# Patient Record
Sex: FEMALE | Race: OTHER | Employment: FULL TIME | URBAN - METROPOLITAN AREA
[De-identification: names, ages, dates, MRNs, and addresses within clinical notes are randomized per-mention and may not be internally consistent; named-entity substitution may affect disease eponyms.]

---

## 2017-10-04 ENCOUNTER — HOSPITAL ENCOUNTER (OUTPATIENT)
Dept: GENERAL RADIOLOGY | Age: 57
Discharge: HOME | End: 2017-10-04
Payer: COMMERCIAL

## 2017-10-04 ENCOUNTER — HOSPITAL ENCOUNTER (OUTPATIENT)
Dept: PREADMISSION TESTING | Age: 57
Discharge: HOME | End: 2017-10-04
Payer: COMMERCIAL

## 2017-10-04 VITALS
DIASTOLIC BLOOD PRESSURE: 69 MMHG | HEART RATE: 63 BPM | BODY MASS INDEX: 22.74 KG/M2 | SYSTOLIC BLOOD PRESSURE: 124 MMHG | WEIGHT: 123.56 LBS | RESPIRATION RATE: 16 BRPM | OXYGEN SATURATION: 100 % | HEIGHT: 62 IN

## 2017-10-04 LAB
-: ABNORMAL
ABSOLUTE EOS #: 0.1 K/UL (ref 0–0.4)
ABSOLUTE LYMPH #: 2.1 K/UL (ref 1–4.8)
ABSOLUTE MONO #: 0.4 K/UL (ref 0.2–0.8)
AMORPHOUS: ABNORMAL
ANION GAP SERPL CALCULATED.3IONS-SCNC: 17 MMOL/L (ref 9–17)
BACTERIA: ABNORMAL
BASOPHILS # BLD: 0 %
BASOPHILS ABSOLUTE: 0 K/UL (ref 0–0.2)
BILIRUBIN URINE: NEGATIVE
CASTS UA: ABNORMAL /LPF
CHLORIDE BLD-SCNC: 96 MMOL/L (ref 98–107)
CO2: 28 MMOL/L (ref 20–31)
COLOR: YELLOW
COMMENT UA: ABNORMAL
CRYSTALS, UA: ABNORMAL /HPF
DIFFERENTIAL TYPE: NORMAL
EOSINOPHILS RELATIVE PERCENT: 2 %
EPITHELIAL CELLS UA: ABNORMAL /HPF
GLUCOSE URINE: NEGATIVE
HCT VFR BLD CALC: 41.9 % (ref 36–46)
HEMOGLOBIN: 14.1 G/DL (ref 12–16)
INR BLD: 1
KETONES, URINE: NEGATIVE
LEUKOCYTE ESTERASE, URINE: NEGATIVE
LYMPHOCYTES # BLD: 36 %
MCH RBC QN AUTO: 30.3 PG (ref 26–34)
MCHC RBC AUTO-ENTMCNC: 33.7 G/DL (ref 31–37)
MCV RBC AUTO: 89.9 FL (ref 80–100)
MONOCYTES # BLD: 7 %
MUCUS: ABNORMAL
NITRITE, URINE: NEGATIVE
OTHER OBSERVATIONS UA: ABNORMAL
PARTIAL THROMBOPLASTIN TIME: 26.9 SEC (ref 23–31)
PDW BLD-RTO: 12.9 % (ref 11.5–14.5)
PH UA: 7 (ref 5–8)
PLATELET # BLD: 328 K/UL (ref 130–400)
PLATELET ESTIMATE: NORMAL
PMV BLD AUTO: 7.6 FL (ref 6–12)
POTASSIUM SERPL-SCNC: 4.1 MMOL/L (ref 3.7–5.3)
PROTEIN UA: NEGATIVE
PROTHROMBIN TIME: 10.5 SEC (ref 9.7–11.6)
RBC # BLD: 4.66 M/UL (ref 4–5.2)
RBC # BLD: NORMAL 10*6/UL
RBC UA: ABNORMAL /HPF (ref 0–2)
RENAL EPITHELIAL, UA: ABNORMAL /HPF
SEG NEUTROPHILS: 55 %
SEGMENTED NEUTROPHILS ABSOLUTE COUNT: 3.3 K/UL (ref 1.8–7.7)
SODIUM BLD-SCNC: 141 MMOL/L (ref 135–144)
SPECIFIC GRAVITY UA: 1.01 (ref 1–1.03)
TRICHOMONAS: ABNORMAL
TURBIDITY: CLEAR
URINE HGB: ABNORMAL
UROBILINOGEN, URINE: NORMAL
WBC # BLD: 5.9 K/UL (ref 3.5–11)
WBC # BLD: NORMAL 10*3/UL
WBC UA: ABNORMAL /HPF (ref 0–5)
YEAST: ABNORMAL

## 2017-10-04 PROCEDURE — 85025 COMPLETE CBC W/AUTO DIFF WBC: CPT

## 2017-10-04 PROCEDURE — 87086 URINE CULTURE/COLONY COUNT: CPT

## 2017-10-04 PROCEDURE — 87641 MR-STAPH DNA AMP PROBE: CPT

## 2017-10-04 PROCEDURE — 80051 ELECTROLYTE PANEL: CPT

## 2017-10-04 PROCEDURE — 86900 BLOOD TYPING SEROLOGIC ABO: CPT

## 2017-10-04 PROCEDURE — 36415 COLL VENOUS BLD VENIPUNCTURE: CPT

## 2017-10-04 PROCEDURE — 86901 BLOOD TYPING SEROLOGIC RH(D): CPT

## 2017-10-04 PROCEDURE — 87088 URINE BACTERIA CULTURE: CPT

## 2017-10-04 PROCEDURE — 85730 THROMBOPLASTIN TIME PARTIAL: CPT

## 2017-10-04 PROCEDURE — 85610 PROTHROMBIN TIME: CPT

## 2017-10-04 PROCEDURE — 87186 SC STD MICRODIL/AGAR DIL: CPT

## 2017-10-04 PROCEDURE — 86920 COMPATIBILITY TEST SPIN: CPT

## 2017-10-04 PROCEDURE — 86850 RBC ANTIBODY SCREEN: CPT

## 2017-10-04 PROCEDURE — 81001 URINALYSIS AUTO W/SCOPE: CPT

## 2017-10-04 PROCEDURE — 71020 XR CHEST STANDARD TWO VW: CPT

## 2017-10-04 RX ORDER — IBUPROFEN 200 MG
200 TABLET ORAL EVERY 6 HOURS PRN
Status: ON HOLD | COMMUNITY
End: 2017-10-17 | Stop reason: HOSPADM

## 2017-10-04 RX ORDER — ACETAMINOPHEN, ASPIRIN AND CAFFEINE 250; 250; 65 MG/1; MG/1; MG/1
1 TABLET, FILM COATED ORAL EVERY 6 HOURS PRN
Status: ON HOLD | COMMUNITY
End: 2017-10-17 | Stop reason: HOSPADM

## 2017-10-04 RX ORDER — THIAMINE HCL 100 MG
500 TABLET ORAL DAILY
COMMUNITY

## 2017-10-04 ASSESSMENT — PAIN DESCRIPTION - ORIENTATION: ORIENTATION: LOWER

## 2017-10-04 ASSESSMENT — PAIN DESCRIPTION - LOCATION: LOCATION: BACK

## 2017-10-04 ASSESSMENT — PAIN DESCRIPTION - FREQUENCY: FREQUENCY: CONTINUOUS

## 2017-10-04 ASSESSMENT — PAIN DESCRIPTION - DIRECTION: RADIATING_TOWARDS: DOWN LEFT LEG TO FOOT

## 2017-10-04 ASSESSMENT — PAIN DESCRIPTION - DESCRIPTORS: DESCRIPTORS: SHARP;TINGLING

## 2017-10-04 ASSESSMENT — PAIN SCALES - GENERAL: PAINLEVEL_OUTOF10: 5

## 2017-10-04 ASSESSMENT — PAIN DESCRIPTION - PAIN TYPE: TYPE: ACUTE PAIN

## 2017-10-04 NOTE — PRE-PROCEDURE INSTRUCTIONS
[facility] ON Monday, October 16th at 08:00 AM    Once you enter the hospital lobby, take the elevators to the second floor. Check-In is at the surgery registration desk. If you have been given a blood band, you must bring it with you the day of surgery. Continue to take your home medications as you normally do up to and including the night before surgery with the exception of any blood thinning medications. Please stop any blood thinning medications as directed by your surgeon or prescribing physician. Failure to stop certain medications may interfere with your scheduled surgery. These may include:  Aspirin, Warfarin (Coumadin), Clopidogrel (Plavix), Ibuprofen (Motrin, Advil), Naproxen (Aleve), Meloxicam (Mobic), Celecoxib (Celebrex), Eliquis, Pradaxa, Xarelto, Effient, Fish Oil, Herbal supplements. Stop motrin and multivitamin one week prior to surgery. Tylenol is okay to take    If you are diabetic, do not take any of your diabetic medications by mouth the morning of surgery. If you are taking insulin contact the doctor that manages your diabetes for instructions about any changes to your insulin dosages the day before surgery. Do not inject insulin or other injectable diabetic medications the morning of surgery unless otherwise instructed by the doctor who manages your diabetes. Please take the following medication(s) the day of surgery with a small sip of water:  omeprazole  Please use your inhalers at home the day of surgery. PREPARING FOR YOUR SURGERY:     Before surgery, you can play an important role in your own health. Because skin is not sterile, we need to be sure that your skin is as free of germs as possible before surgery by carefully washing before surgery. Preparing or prepping skin before surgery can reduce the risk of a surgical site infection.   Do not shave the area of your body where your surgery will be performed unless you received specific permission from your physician. You will need to shower at home the night before surgery and the morning of surgery with a special soap called chlorhexidine gluconate (CHG*). *Not to be used by people allergic to Chlorhexidine Gluconate (CHG). Following these instructions will help you be sure that your skin is clean before surgery. Instructions on cleaning your skin before surgery: The night before your surgery:      You will need to shower with warm water (not hot) and the CHG soap.  Use a clean wash cloth and a clean towel. Have clean clothes available to put on after the shower.    First wash your hair with regular shampoo. Rinse your hair and body thoroughly to remove the shampoo. [place] Wash your face with your regular soap or water only. Thoroughly rinse your body with warm water from the neck down.  Turn water off to prevent rinsing the soap off too soon.  With a clean wet washcloth and half of the CHG soap in the bottle, lather your entire body from the neck down. Do not use CHG soap near your eyes or ears to avoid injury to those areas.  Wash thoroughly, paying special attention to the area where your surgery will be performed.  Wash your body gently for five (5) minutes. Avoid scrubbing your skin too hard.  Turn the water back on and rinse your body thoroughly.  Pat yourself dry with a clean, soft towel. Do not apply lotion, cream or powder.  Dress with clean freshly washed clothes. The morning of surgery:     Repeat shower following steps above - using remaining half of CHG soap in bottle. Patient Instructions:    [place] If you are having any type of anesthesia you are to have nothing to eat or drink after midnight the night before your surgery. This includes gum, mints, water or smoking or chewing tobacco.  The only exception to this is a small sip of water to take with any morning dose of heart, blood pressure, or seizure medications.   No alcoholic beverages for 24 hours prior to surgery.  Bring your eyeglasses and case with you. No contacts are to be worn the day of surgery. You also may bring your hearing aids.  If you are on C-PAP or Bi-PAP at home and plan on staying in the hospital overnight for your surgery please bring the machine with you. · Do not wear any jewelry or body piercings day of surgery. Also, NO lotion, perfume or deodorant to be used the day of surgery. No nail polish on the operative extremity (arm/leg surgeries)    · Do not bring any valuables such as jewelry, cash, or credit cards. If you are staying overnight with us, please bring a small bag of personal items.  Please wear loose, comfortable clothing. If you are potentially going to have a cast or brace bring clothing that will fit over them.  In case of illness - If you have cold or flu like symptoms (high fever, runny nose, sore throat, cough, etc.) rash, nausea, vomiting, loose stools, and/or recent contact with someone who has a contagious disease (chicken pox, measles, etc.) Please call your doctor before coming to the hospital.     If your child is having surgery please make arrangements for any other children to be cared for at home on the day of surgery. Other children are not permitted in recovery room and we want you to be able to spend time with the patient. If other arrangements are not available then we suggest that you have a second adult to stay in the waiting room. Day of Surgery/Procedure:    As a patient at [facility] you can expect quality medical and nursing care that is centered on your individual needs. Our goal is to make your surgical experience as comfortable as possible    . Transportation After Your Surgery/Procedure:     You will need a friend or family member to drive you home after your procedure. Your  must be 25years of age or older and able to sign off on your discharge instructions. A taxi cab or any other form of public transportation is not acceptable. Your friend or family member must stay at the hospital throughout your procedure. Someone must remain with you for the first 24 hours after your surgery if you receive anesthesia or medication. If you do not have someone to stay with you, your procedure may be cancelled.       If you have any other questions regarding your procedure or the day of surgery, please call [phone]      _________________________  ____________________________  Signature (Patient)              Signature (Provider) & date

## 2017-10-04 NOTE — H&P
clots  GASTROINTESTINAL:  negative for nausea, vomiting, diarrhea, constipation, abdominal pain, HAS GERD AS reflux, hematemesis and hemtochezia  GENITOURINARY:  negative for frequency, dysuria, nocturia, urinary incontinence and hematuria  INTEGUMENT/BREAST:  negative for rash and skin lesion(s)  HEMATOLOGIC/LYMPHATIC:  negative for lymphadenopathy  ENDOCRINE:  negative for diabetes, thyroid irregularities  MUSCULOSKELETAL:  HAS BACK AND LEFT LEG   pain, joint swelling, stiff joints, decreased range of motion and muscle weakness  NEUROLOGICAL:  negative for headaches, dizziness, seizures, memory problems, gait problems, tremor, numbness, syncope, near syncope and history of head injury, stroke or TIA  BEHAVIOR/PSYCH:  negative for depressed mood, anxiety and substance abuse issues    EXAM:  /69  Pulse 63  Resp 16  Ht 5' 1.5\" (1.562 m)  Wt 123 lb 9 oz (56 kg)  SpO2 100%  BMI 22.97 kg/m2  /69  Pulse 63  Resp 16  Ht 5' 1.5\" (1.562 m)  Wt 123 lb 9 oz (56 kg)  SpO2 100%  BMI 22.97 kg/m2  General appearance: alert, appears stated age and cooperative  Head: Normocephalic, without obvious abnormality, atraumatic  Eyes: conjunctivae/corneas clear. PERRL, EOM's intact. Fundi benign. Nose: Nares normal. Septum midline. Mucosa normal. No drainage or sinus tenderness. Throat: lips, mucosa, and tongue normal; teeth and gums normal  Neck: no adenopathy, no carotid bruit, no JVD, supple, symmetrical, trachea midline and thyroid not enlarged, symmetric, no tenderness/mass/nodules  Back: symmetric, no curvature. ROM normal. No CVA tenderness.   Lungs: clear to auscultation bilaterally  Heart: regular rate and rhythm, S1, S2 normal, no murmur, click, rub or gallop  Abdomen: soft, non-tender; bowel sounds normal; no masses,  no organomegaly  Extremities: extremities normal, atraumatic, no cyanosis or edema  Pulses: 2+ and symmetric  Skin: Skin color, texture, turgor normal. No rashes or lesions  Neurologic:

## 2017-10-05 LAB
MRSA, DNA, NASAL: NORMAL
SPECIMEN DESCRIPTION: NORMAL

## 2017-10-08 LAB
CULTURE: ABNORMAL
CULTURE: ABNORMAL
Lab: ABNORMAL
ORGANISM: ABNORMAL
SPECIMEN DESCRIPTION: ABNORMAL
SPECIMEN DESCRIPTION: ABNORMAL
STATUS: ABNORMAL

## 2017-10-16 ENCOUNTER — HOSPITAL ENCOUNTER (INPATIENT)
Age: 57
LOS: 1 days | Discharge: HOME | DRG: 460 | End: 2017-10-17
Attending: ORTHOPAEDIC SURGERY | Admitting: ORTHOPAEDIC SURGERY
Payer: COMMERCIAL

## 2017-10-16 ENCOUNTER — ANESTHESIA EVENT (OUTPATIENT)
Dept: OPERATING ROOM | Age: 57
DRG: 460 | End: 2017-10-16
Payer: COMMERCIAL

## 2017-10-16 ENCOUNTER — ANESTHESIA (OUTPATIENT)
Dept: OPERATING ROOM | Age: 57
DRG: 460 | End: 2017-10-16
Payer: COMMERCIAL

## 2017-10-16 ENCOUNTER — APPOINTMENT (OUTPATIENT)
Dept: GENERAL RADIOLOGY | Age: 57
DRG: 460 | End: 2017-10-16
Attending: ORTHOPAEDIC SURGERY
Payer: COMMERCIAL

## 2017-10-16 VITALS — DIASTOLIC BLOOD PRESSURE: 50 MMHG | TEMPERATURE: 96.3 F | OXYGEN SATURATION: 100 % | SYSTOLIC BLOOD PRESSURE: 81 MMHG

## 2017-10-16 PROBLEM — M48.061 SPINAL STENOSIS, LUMBAR: Status: ACTIVE | Noted: 2017-10-16

## 2017-10-16 PROBLEM — M43.17 ACQUIRED SPONDYLOLISTHESIS OF LUMBOSACRAL REGION: Status: ACTIVE | Noted: 2017-10-16

## 2017-10-16 PROCEDURE — 2580000003 HC RX 258: Performed by: ORTHOPAEDIC SURGERY

## 2017-10-16 PROCEDURE — 6370000000 HC RX 637 (ALT 250 FOR IP): Performed by: ORTHOPAEDIC SURGERY

## 2017-10-16 PROCEDURE — 6360000002 HC RX W HCPCS: Performed by: NURSE ANESTHETIST, CERTIFIED REGISTERED

## 2017-10-16 PROCEDURE — A6219 GAUZE <= 16 SQ IN W/BORDER: HCPCS | Performed by: ORTHOPAEDIC SURGERY

## 2017-10-16 PROCEDURE — 72100 X-RAY EXAM L-S SPINE 2/3 VWS: CPT

## 2017-10-16 PROCEDURE — 3700000001 HC ADD 15 MINUTES (ANESTHESIA): Performed by: ORTHOPAEDIC SURGERY

## 2017-10-16 PROCEDURE — 3600000004 HC SURGERY LEVEL 4 BASE: Performed by: ORTHOPAEDIC SURGERY

## 2017-10-16 PROCEDURE — 2580000003 HC RX 258: Performed by: ANESTHESIOLOGY

## 2017-10-16 PROCEDURE — 0SG30AJ FUSION OF LUMBOSACRAL JOINT WITH INTERBODY FUSION DEVICE, POSTERIOR APPROACH, ANTERIOR COLUMN, OPEN APPROACH: ICD-10-PCS | Performed by: ORTHOPAEDIC SURGERY

## 2017-10-16 PROCEDURE — 7100000000 HC PACU RECOVERY - FIRST 15 MIN: Performed by: ORTHOPAEDIC SURGERY

## 2017-10-16 PROCEDURE — A4364 ADHESIVE, LIQUID OR EQUAL: HCPCS | Performed by: ORTHOPAEDIC SURGERY

## 2017-10-16 PROCEDURE — 7100000001 HC PACU RECOVERY - ADDTL 15 MIN: Performed by: ORTHOPAEDIC SURGERY

## 2017-10-16 PROCEDURE — C9359 IMPLNT,BON VOID FILLER-PUTTY: HCPCS | Performed by: ORTHOPAEDIC SURGERY

## 2017-10-16 PROCEDURE — 6370000000 HC RX 637 (ALT 250 FOR IP): Performed by: PHYSICIAN ASSISTANT

## 2017-10-16 PROCEDURE — 6360000002 HC RX W HCPCS: Performed by: ORTHOPAEDIC SURGERY

## 2017-10-16 PROCEDURE — 3600000014 HC SURGERY LEVEL 4 ADDTL 15MIN: Performed by: ORTHOPAEDIC SURGERY

## 2017-10-16 PROCEDURE — 6360000002 HC RX W HCPCS: Performed by: ANESTHESIOLOGY

## 2017-10-16 PROCEDURE — 2500000003 HC RX 250 WO HCPCS: Performed by: NURSE ANESTHETIST, CERTIFIED REGISTERED

## 2017-10-16 PROCEDURE — C1713 ANCHOR/SCREW BN/BN,TIS/BN: HCPCS | Performed by: ORTHOPAEDIC SURGERY

## 2017-10-16 PROCEDURE — 2720000010 HC SURG SUPPLY STERILE: Performed by: ORTHOPAEDIC SURGERY

## 2017-10-16 PROCEDURE — 3700000000 HC ANESTHESIA ATTENDED CARE: Performed by: ORTHOPAEDIC SURGERY

## 2017-10-16 PROCEDURE — 6360000002 HC RX W HCPCS: Performed by: PHYSICIAN ASSISTANT

## 2017-10-16 PROCEDURE — 2580000003 HC RX 258: Performed by: PHYSICIAN ASSISTANT

## 2017-10-16 PROCEDURE — 3209999900 FLUORO FOR SURGICAL PROCEDURES

## 2017-10-16 PROCEDURE — 2780000010 HC IMPLANT OTHER: Performed by: ORTHOPAEDIC SURGERY

## 2017-10-16 PROCEDURE — L8699 PROSTHETIC IMPLANT NOS: HCPCS | Performed by: ORTHOPAEDIC SURGERY

## 2017-10-16 PROCEDURE — 2580000003 HC RX 258: Performed by: NURSE ANESTHETIST, CERTIFIED REGISTERED

## 2017-10-16 PROCEDURE — 1200000000 HC SEMI PRIVATE

## 2017-10-16 PROCEDURE — 2500000003 HC RX 250 WO HCPCS: Performed by: ORTHOPAEDIC SURGERY

## 2017-10-16 DEVICE — KIT TLIF DISP TUF HOOP MAXCESS MAS: Type: IMPLANTABLE DEVICE | Site: BACK | Status: FUNCTIONAL

## 2017-10-16 DEVICE — SCREW SPNL L40MM DIA6.5MM POST THORACOLUMBOSACRAL MOD SHANK: Type: IMPLANTABLE DEVICE | Site: BACK | Status: FUNCTIONAL

## 2017-10-16 DEVICE — SCREW SPNL L40MM DIA6.5MM POLYAX 2C RELINE MAS: Type: IMPLANTABLE DEVICE | Site: BACK | Status: FUNCTIONAL

## 2017-10-16 DEVICE — ROD SPNL L40MM DIA5.5MM POST THORACOLUMBOSACRAL TI LORD: Type: IMPLANTABLE DEVICE | Site: BACK | Status: FUNCTIONAL

## 2017-10-16 DEVICE — SCREW SPNL L45MM DIA6.5MM POLYAX 2C RELINE MAS: Type: IMPLANTABLE DEVICE | Site: BACK | Status: FUNCTIONAL

## 2017-10-16 DEVICE — SCREW SPNL DIA5.5MM OPN TULIP LOK RELINE: Type: IMPLANTABLE DEVICE | Site: BACK | Status: FUNCTIONAL

## 2017-10-16 DEVICE — SCREW SPNL L45MM DIA6.5MM POST THORACOLUMBOSACRAL MOD SHANK: Type: IMPLANTABLE DEVICE | Site: BACK | Status: FUNCTIONAL

## 2017-10-16 DEVICE — SCREW SPNL MOD TULIP RELINE: Type: IMPLANTABLE DEVICE | Site: BACK | Status: FUNCTIONAL

## 2017-10-16 DEVICE — GRAFT BNE PTTY 10 CC DBM DBMP10] UNIVERSAL MEDICAL PRODUCTS]: Type: IMPLANTABLE DEVICE | Status: FUNCTIONAL

## 2017-10-16 DEVICE — TIM-TISSUE CANCELLOUS 30.0CC CRUSHED: Type: IMPLANTABLE DEVICE | Site: BACK | Status: FUNCTIONAL

## 2017-10-16 DEVICE — IMPLANTABLE DEVICE: Type: IMPLANTABLE DEVICE | Site: BACK | Status: FUNCTIONAL

## 2017-10-16 RX ORDER — HYDRALAZINE HYDROCHLORIDE 20 MG/ML
5 INJECTION INTRAMUSCULAR; INTRAVENOUS EVERY 10 MIN PRN
Status: DISCONTINUED | OUTPATIENT
Start: 2017-10-16 | End: 2017-10-16 | Stop reason: HOSPADM

## 2017-10-16 RX ORDER — DEXAMETHASONE SODIUM PHOSPHATE 10 MG/ML
6 INJECTION INTRAMUSCULAR; INTRAVENOUS EVERY 8 HOURS
Status: COMPLETED | OUTPATIENT
Start: 2017-10-16 | End: 2017-10-17

## 2017-10-16 RX ORDER — MIDAZOLAM HYDROCHLORIDE 1 MG/ML
INJECTION INTRAMUSCULAR; INTRAVENOUS PRN
Status: DISCONTINUED | OUTPATIENT
Start: 2017-10-16 | End: 2017-10-16 | Stop reason: SDUPTHER

## 2017-10-16 RX ORDER — B-COMPLEX WITH VITAMIN C
1 TABLET ORAL DAILY
Status: DISCONTINUED | OUTPATIENT
Start: 2017-10-16 | End: 2017-10-16 | Stop reason: RX

## 2017-10-16 RX ORDER — OXYCODONE HYDROCHLORIDE AND ACETAMINOPHEN 5; 325 MG/1; MG/1
2 TABLET ORAL EVERY 4 HOURS PRN
Status: DISCONTINUED | OUTPATIENT
Start: 2017-10-16 | End: 2017-10-17 | Stop reason: HOSPADM

## 2017-10-16 RX ORDER — DIAZEPAM 5 MG/1
5 TABLET ORAL EVERY 6 HOURS PRN
Status: DISCONTINUED | OUTPATIENT
Start: 2017-10-16 | End: 2017-10-17 | Stop reason: HOSPADM

## 2017-10-16 RX ORDER — SODIUM CHLORIDE 0.9 % (FLUSH) 0.9 %
10 SYRINGE (ML) INJECTION PRN
Status: DISCONTINUED | OUTPATIENT
Start: 2017-10-16 | End: 2017-10-17 | Stop reason: HOSPADM

## 2017-10-16 RX ORDER — MORPHINE SULFATE 15 MG/1
15 TABLET, FILM COATED, EXTENDED RELEASE ORAL EVERY 12 HOURS SCHEDULED
Status: DISCONTINUED | OUTPATIENT
Start: 2017-10-16 | End: 2017-10-17 | Stop reason: HOSPADM

## 2017-10-16 RX ORDER — OXYCODONE HYDROCHLORIDE 5 MG/1
5 TABLET ORAL EVERY 4 HOURS PRN
Status: DISCONTINUED | OUTPATIENT
Start: 2017-10-16 | End: 2017-10-17 | Stop reason: HOSPADM

## 2017-10-16 RX ORDER — DOCUSATE SODIUM 100 MG/1
100 CAPSULE, LIQUID FILLED ORAL 2 TIMES DAILY
Status: DISCONTINUED | OUTPATIENT
Start: 2017-10-16 | End: 2017-10-17 | Stop reason: HOSPADM

## 2017-10-16 RX ORDER — SODIUM CHLORIDE, SODIUM LACTATE, POTASSIUM CHLORIDE, CALCIUM CHLORIDE 600; 310; 30; 20 MG/100ML; MG/100ML; MG/100ML; MG/100ML
INJECTION, SOLUTION INTRAVENOUS CONTINUOUS
Status: DISCONTINUED | OUTPATIENT
Start: 2017-10-16 | End: 2017-10-16

## 2017-10-16 RX ORDER — SODIUM CHLORIDE, SODIUM LACTATE, POTASSIUM CHLORIDE, CALCIUM CHLORIDE 600; 310; 30; 20 MG/100ML; MG/100ML; MG/100ML; MG/100ML
INJECTION, SOLUTION INTRAVENOUS CONTINUOUS PRN
Status: DISCONTINUED | OUTPATIENT
Start: 2017-10-16 | End: 2017-10-16 | Stop reason: SDUPTHER

## 2017-10-16 RX ORDER — ONDANSETRON 2 MG/ML
4 INJECTION INTRAMUSCULAR; INTRAVENOUS
Status: DISCONTINUED | OUTPATIENT
Start: 2017-10-16 | End: 2017-10-16 | Stop reason: HOSPADM

## 2017-10-16 RX ORDER — HYDROMORPHONE HCL 110MG/55ML
0.5 PATIENT CONTROLLED ANALGESIA SYRINGE INTRAVENOUS EVERY 5 MIN PRN
Status: DISCONTINUED | OUTPATIENT
Start: 2017-10-16 | End: 2017-10-16 | Stop reason: HOSPADM

## 2017-10-16 RX ORDER — MEPERIDINE HYDROCHLORIDE 25 MG/ML
12.5 INJECTION INTRAMUSCULAR; INTRAVENOUS; SUBCUTANEOUS EVERY 5 MIN PRN
Status: DISCONTINUED | OUTPATIENT
Start: 2017-10-16 | End: 2017-10-16 | Stop reason: HOSPADM

## 2017-10-16 RX ORDER — SODIUM CHLORIDE 0.9 % (FLUSH) 0.9 %
10 SYRINGE (ML) INJECTION EVERY 12 HOURS SCHEDULED
Status: DISCONTINUED | OUTPATIENT
Start: 2017-10-16 | End: 2017-10-17 | Stop reason: HOSPADM

## 2017-10-16 RX ORDER — SODIUM CHLORIDE 9 MG/ML
INJECTION, SOLUTION INTRAVENOUS CONTINUOUS
Status: DISCONTINUED | OUTPATIENT
Start: 2017-10-16 | End: 2017-10-17 | Stop reason: HOSPADM

## 2017-10-16 RX ORDER — DEXAMETHASONE SODIUM PHOSPHATE 10 MG/ML
4 INJECTION INTRAMUSCULAR; INTRAVENOUS
Status: DISCONTINUED | OUTPATIENT
Start: 2017-10-16 | End: 2017-10-16 | Stop reason: HOSPADM

## 2017-10-16 RX ORDER — OXYCODONE HYDROCHLORIDE AND ACETAMINOPHEN 5; 325 MG/1; MG/1
1 TABLET ORAL EVERY 4 HOURS PRN
Status: DISCONTINUED | OUTPATIENT
Start: 2017-10-16 | End: 2017-10-17 | Stop reason: HOSPADM

## 2017-10-16 RX ORDER — LABETALOL HYDROCHLORIDE 5 MG/ML
5 INJECTION, SOLUTION INTRAVENOUS EVERY 10 MIN PRN
Status: DISCONTINUED | OUTPATIENT
Start: 2017-10-16 | End: 2017-10-16 | Stop reason: HOSPADM

## 2017-10-16 RX ORDER — HYDROMORPHONE HCL 110MG/55ML
0.5 PATIENT CONTROLLED ANALGESIA SYRINGE INTRAVENOUS EVERY 5 MIN PRN
Status: COMPLETED | OUTPATIENT
Start: 2017-10-16 | End: 2017-10-16

## 2017-10-16 RX ORDER — MORPHINE SULFATE 2 MG/ML
2 INJECTION, SOLUTION INTRAMUSCULAR; INTRAVENOUS
Status: DISCONTINUED | OUTPATIENT
Start: 2017-10-16 | End: 2017-10-17 | Stop reason: HOSPADM

## 2017-10-16 RX ORDER — FENTANYL CITRATE 50 UG/ML
25 INJECTION, SOLUTION INTRAMUSCULAR; INTRAVENOUS EVERY 5 MIN PRN
Status: DISCONTINUED | OUTPATIENT
Start: 2017-10-16 | End: 2017-10-16 | Stop reason: HOSPADM

## 2017-10-16 RX ORDER — ACETAMINOPHEN 10 MG/ML
1000 INJECTION, SOLUTION INTRAVENOUS ONCE
Status: COMPLETED | OUTPATIENT
Start: 2017-10-16 | End: 2017-10-16

## 2017-10-16 RX ORDER — SUCCINYLCHOLINE CHLORIDE 20 MG/ML
INJECTION INTRAMUSCULAR; INTRAVENOUS PRN
Status: DISCONTINUED | OUTPATIENT
Start: 2017-10-16 | End: 2017-10-16 | Stop reason: SDUPTHER

## 2017-10-16 RX ORDER — DEXAMETHASONE SODIUM PHOSPHATE 10 MG/ML
INJECTION INTRAMUSCULAR; INTRAVENOUS PRN
Status: DISCONTINUED | OUTPATIENT
Start: 2017-10-16 | End: 2017-10-16 | Stop reason: SDUPTHER

## 2017-10-16 RX ORDER — ONDANSETRON 2 MG/ML
4 INJECTION INTRAMUSCULAR; INTRAVENOUS EVERY 6 HOURS PRN
Status: DISCONTINUED | OUTPATIENT
Start: 2017-10-16 | End: 2017-10-17 | Stop reason: HOSPADM

## 2017-10-16 RX ORDER — LIDOCAINE HYDROCHLORIDE 20 MG/ML
INJECTION, SOLUTION EPIDURAL; INFILTRATION; INTRACAUDAL; PERINEURAL PRN
Status: DISCONTINUED | OUTPATIENT
Start: 2017-10-16 | End: 2017-10-16 | Stop reason: SDUPTHER

## 2017-10-16 RX ORDER — PANTOPRAZOLE SODIUM 40 MG/1
40 TABLET, DELAYED RELEASE ORAL
Status: DISCONTINUED | OUTPATIENT
Start: 2017-10-16 | End: 2017-10-17 | Stop reason: HOSPADM

## 2017-10-16 RX ORDER — OMEPRAZOLE 20 MG/1
20 CAPSULE, DELAYED RELEASE ORAL DAILY
Status: DISCONTINUED | OUTPATIENT
Start: 2017-10-16 | End: 2017-10-16 | Stop reason: CLARIF

## 2017-10-16 RX ORDER — MORPHINE SULFATE 2 MG/ML
4 INJECTION, SOLUTION INTRAMUSCULAR; INTRAVENOUS
Status: DISCONTINUED | OUTPATIENT
Start: 2017-10-16 | End: 2017-10-17 | Stop reason: HOSPADM

## 2017-10-16 RX ORDER — PROPOFOL 10 MG/ML
INJECTION, EMULSION INTRAVENOUS PRN
Status: DISCONTINUED | OUTPATIENT
Start: 2017-10-16 | End: 2017-10-16 | Stop reason: SDUPTHER

## 2017-10-16 RX ORDER — BUPIVACAINE HYDROCHLORIDE AND EPINEPHRINE 5; 5 MG/ML; UG/ML
INJECTION, SOLUTION EPIDURAL; INTRACAUDAL; PERINEURAL PRN
Status: DISCONTINUED | OUTPATIENT
Start: 2017-10-16 | End: 2017-10-16 | Stop reason: HOSPADM

## 2017-10-16 RX ORDER — DIPHENHYDRAMINE HYDROCHLORIDE 50 MG/ML
12.5 INJECTION INTRAMUSCULAR; INTRAVENOUS
Status: DISCONTINUED | OUTPATIENT
Start: 2017-10-16 | End: 2017-10-16 | Stop reason: HOSPADM

## 2017-10-16 RX ORDER — FENTANYL CITRATE 50 UG/ML
INJECTION, SOLUTION INTRAMUSCULAR; INTRAVENOUS PRN
Status: DISCONTINUED | OUTPATIENT
Start: 2017-10-16 | End: 2017-10-16 | Stop reason: SDUPTHER

## 2017-10-16 RX ORDER — ONDANSETRON 2 MG/ML
INJECTION INTRAMUSCULAR; INTRAVENOUS PRN
Status: DISCONTINUED | OUTPATIENT
Start: 2017-10-16 | End: 2017-10-16 | Stop reason: SDUPTHER

## 2017-10-16 RX ADMIN — PHENYLEPHRINE HYDROCHLORIDE 100 MCG: 10 INJECTION INTRAVENOUS at 11:53

## 2017-10-16 RX ADMIN — FENTANYL CITRATE 50 MCG: 50 INJECTION, SOLUTION INTRAMUSCULAR; INTRAVENOUS at 13:27

## 2017-10-16 RX ADMIN — DIAZEPAM 5 MG: 5 TABLET ORAL at 16:14

## 2017-10-16 RX ADMIN — DOCUSATE SODIUM 100 MG: 100 CAPSULE, LIQUID FILLED ORAL at 22:05

## 2017-10-16 RX ADMIN — OXYCODONE HYDROCHLORIDE 5 MG: 5 TABLET ORAL at 20:22

## 2017-10-16 RX ADMIN — DEXAMETHASONE SODIUM PHOSPHATE 10 MG: 10 INJECTION INTRAMUSCULAR; INTRAVENOUS at 11:01

## 2017-10-16 RX ADMIN — ONDANSETRON 4 MG: 2 INJECTION, SOLUTION INTRAMUSCULAR; INTRAVENOUS at 13:00

## 2017-10-16 RX ADMIN — PHENYLEPHRINE HYDROCHLORIDE 100 MCG: 10 INJECTION INTRAVENOUS at 11:46

## 2017-10-16 RX ADMIN — PHENYLEPHRINE HYDROCHLORIDE 200 MCG: 10 INJECTION INTRAVENOUS at 12:02

## 2017-10-16 RX ADMIN — DIAZEPAM 5 MG: 5 TABLET ORAL at 23:19

## 2017-10-16 RX ADMIN — FENTANYL CITRATE 25 MCG: 50 INJECTION INTRAMUSCULAR; INTRAVENOUS at 13:45

## 2017-10-16 RX ADMIN — SUCCINYLCHOLINE CHLORIDE 80 MG: 20 INJECTION, SOLUTION INTRAMUSCULAR; INTRAVENOUS at 10:57

## 2017-10-16 RX ADMIN — HYDROMORPHONE HYDROCHLORIDE 0.5 MG: 2 INJECTION, SOLUTION INTRAMUSCULAR; INTRAVENOUS; SUBCUTANEOUS at 14:18

## 2017-10-16 RX ADMIN — LIDOCAINE HYDROCHLORIDE 75 MG: 20 INJECTION, SOLUTION EPIDURAL; INFILTRATION; INTRACAUDAL; PERINEURAL at 10:57

## 2017-10-16 RX ADMIN — SODIUM CHLORIDE, POTASSIUM CHLORIDE, SODIUM LACTATE AND CALCIUM CHLORIDE: 600; 310; 30; 20 INJECTION, SOLUTION INTRAVENOUS at 11:35

## 2017-10-16 RX ADMIN — FENTANYL CITRATE 50 MCG: 50 INJECTION, SOLUTION INTRAMUSCULAR; INTRAVENOUS at 13:24

## 2017-10-16 RX ADMIN — PHENYLEPHRINE HYDROCHLORIDE 100 MCG: 10 INJECTION INTRAVENOUS at 12:35

## 2017-10-16 RX ADMIN — ONDANSETRON 4 MG: 2 INJECTION INTRAMUSCULAR; INTRAVENOUS at 19:03

## 2017-10-16 RX ADMIN — SODIUM CHLORIDE, POTASSIUM CHLORIDE, SODIUM LACTATE AND CALCIUM CHLORIDE: 600; 310; 30; 20 INJECTION, SOLUTION INTRAVENOUS at 10:53

## 2017-10-16 RX ADMIN — MIDAZOLAM HYDROCHLORIDE 2 MG: 1 INJECTION, SOLUTION INTRAMUSCULAR; INTRAVENOUS at 10:53

## 2017-10-16 RX ADMIN — HYDROMORPHONE HYDROCHLORIDE 0.5 MG: 2 INJECTION, SOLUTION INTRAMUSCULAR; INTRAVENOUS; SUBCUTANEOUS at 14:29

## 2017-10-16 RX ADMIN — PROPOFOL 150 MG: 10 INJECTION, EMULSION INTRAVENOUS at 10:57

## 2017-10-16 RX ADMIN — FENTANYL CITRATE 100 MCG: 50 INJECTION, SOLUTION INTRAMUSCULAR; INTRAVENOUS at 10:57

## 2017-10-16 RX ADMIN — HYDROMORPHONE HYDROCHLORIDE 0.5 MG: 2 INJECTION, SOLUTION INTRAMUSCULAR; INTRAVENOUS; SUBCUTANEOUS at 15:26

## 2017-10-16 RX ADMIN — MORPHINE SULFATE 15 MG: 15 TABLET, FILM COATED, EXTENDED RELEASE ORAL at 22:06

## 2017-10-16 RX ADMIN — HYDROMORPHONE HYDROCHLORIDE 0.5 MG: 2 INJECTION, SOLUTION INTRAMUSCULAR; INTRAVENOUS; SUBCUTANEOUS at 15:03

## 2017-10-16 RX ADMIN — PHENYLEPHRINE HYDROCHLORIDE 200 MCG: 10 INJECTION INTRAVENOUS at 11:37

## 2017-10-16 RX ADMIN — PHENYLEPHRINE HYDROCHLORIDE 100 MCG: 10 INJECTION INTRAVENOUS at 11:20

## 2017-10-16 RX ADMIN — SODIUM CHLORIDE: 9 INJECTION, SOLUTION INTRAVENOUS at 18:01

## 2017-10-16 RX ADMIN — ACETAMINOPHEN 1000 MG: 10 INJECTION, SOLUTION INTRAVENOUS at 14:45

## 2017-10-16 RX ADMIN — SODIUM CHLORIDE, POTASSIUM CHLORIDE, SODIUM LACTATE AND CALCIUM CHLORIDE: 600; 310; 30; 20 INJECTION, SOLUTION INTRAVENOUS at 08:30

## 2017-10-16 RX ADMIN — CEFAZOLIN SODIUM 2 G: 2 SOLUTION INTRAVENOUS at 11:05

## 2017-10-16 RX ADMIN — PANTOPRAZOLE SODIUM 40 MG: 40 TABLET, DELAYED RELEASE ORAL at 18:01

## 2017-10-16 RX ADMIN — FENTANYL CITRATE 50 MCG: 50 INJECTION, SOLUTION INTRAMUSCULAR; INTRAVENOUS at 11:30

## 2017-10-16 RX ADMIN — HYDROMORPHONE HYDROCHLORIDE 0.5 MG: 2 INJECTION, SOLUTION INTRAMUSCULAR; INTRAVENOUS; SUBCUTANEOUS at 14:02

## 2017-10-16 RX ADMIN — HYDROMORPHONE HYDROCHLORIDE 0.5 MG: 2 INJECTION, SOLUTION INTRAMUSCULAR; INTRAVENOUS; SUBCUTANEOUS at 13:37

## 2017-10-16 RX ADMIN — DEXAMETHASONE SODIUM PHOSPHATE 6 MG: 10 INJECTION INTRAMUSCULAR; INTRAVENOUS at 18:01

## 2017-10-16 RX ADMIN — CEFAZOLIN SODIUM 2 G: 2 SOLUTION INTRAVENOUS at 18:01

## 2017-10-16 ASSESSMENT — PAIN DESCRIPTION - LOCATION
LOCATION: BACK

## 2017-10-16 ASSESSMENT — PAIN SCALES - GENERAL
PAINLEVEL_OUTOF10: 7
PAINLEVEL_OUTOF10: 8
PAINLEVEL_OUTOF10: 5
PAINLEVEL_OUTOF10: 5
PAINLEVEL_OUTOF10: 7
PAINLEVEL_OUTOF10: 8
PAINLEVEL_OUTOF10: 7
PAINLEVEL_OUTOF10: 5
PAINLEVEL_OUTOF10: 0

## 2017-10-16 ASSESSMENT — PAIN - FUNCTIONAL ASSESSMENT: PAIN_FUNCTIONAL_ASSESSMENT: 0-10

## 2017-10-16 ASSESSMENT — PAIN DESCRIPTION - PAIN TYPE
TYPE: SURGICAL PAIN

## 2017-10-16 ASSESSMENT — PAIN DESCRIPTION - FREQUENCY
FREQUENCY: CONTINUOUS
FREQUENCY: CONTINUOUS

## 2017-10-16 ASSESSMENT — PAIN DESCRIPTION - ORIENTATION
ORIENTATION: LOWER
ORIENTATION: LOWER

## 2017-10-16 ASSESSMENT — PAIN DESCRIPTION - ONSET
ONSET: ON-GOING
ONSET: ON-GOING

## 2017-10-16 ASSESSMENT — PAIN DESCRIPTION - DESCRIPTORS
DESCRIPTORS: TIGHTNESS
DESCRIPTORS: ACHING;SHARP;TIGHTNESS
DESCRIPTORS: ACHING;SHARP
DESCRIPTORS: ACHING;SHARP;TIGHTNESS

## 2017-10-16 NOTE — FLOWSHEET NOTE
Patient was very restless states RN's. Patient is now calm and sleeping. RN's asked if  would not enter the room.  left prayer card for RN to give to patient. Silent prayer was said. Follow up if needed.       10/16/17 1933   Encounter Summary   Services provided to: Patient   Referral/Consult From: [facility] Family members   Continue Visiting ([phone] PT: sleeping)   Complexity of Encounter Low   Length of Encounter 15 minutes   Spiritual Assessment Completed Yes   Routine   Type Initial   Assessment Sleeping   Intervention Prayer   Outcome Did not respond

## 2017-10-16 NOTE — PROGRESS NOTES
Pt admitted to room 2108. Oriented to room, call light and bed mechanics. Side rails up x2. Call light within reach. Orders reviewed. Her family is at her bedside.

## 2017-10-16 NOTE — H&P (VIEW-ONLY)
History and Physical  [facility]      Date: 10/4/2017  Name: North Booker  MRN: [id number]  [facility] Date: 10/4/2017  Primary Care: Erlinda Cook MD    History of Present Illness: North Booker is a 62 y.o., female who presents with SEVERE BACK PAIN FOR THE LAST EIGHT MONTHS PAIN  LEVEL RANGES FROM 5/10 TO 8/10 DEPENDING ON ACTIVITY. HER BACK HURTS AT SOME LEVEL CONTINUALLY THE PAIN RADIATES DOWN THE LEFT LEG  TO HER TOES . SHE HAS PREVIOUSLY BEEN TREATED WITH PHYSICAL THERAPY  WITHOUT MUCH HELP. SHE ALSO HAD MANIPULATION PREVIOUSLY. SHE NOW PRESENTS FOR A PRE-TESTING APPOINTMENT PRIOR TO  A MINIMALLY INVASIVE TRANS-FORAMINAL LUMBAR FUSION AT LEFT L 5-S 1 BY  ON 10/16/17. Past Medical History  HAS GERD     Past Surgical History  THREE  SECTIONS,COLONOSCOPY AND EGD     Social History:    TOBACCO:   has no tobacco history on file. Drug use:  has no drug history on file. ETOH:   has no alcohol history on file. HAS TWO TO  THREE DRINKS ON THE WEEKENDS  OCCUPATION:  [employer]ING [employer]   Lives with: NELLIE  Who do you see on a regular basis that helps you:?   AND FAMILY    Family History:   NO HEART DISEASE, NO DIABETES,BROTHER ,MOTHER HAVE HYPERTENSION, NO STROKES,FATHER HAD PROSTATE CANCER , OF METASTATIC DISEASE    Allergies:  NKDA    Medications:  Prior to Admission medications    Not on File       ROS:  chart review and the patient  CONSTITUTIONAL:  negative for  fevers, chills, sweats, fatigue, malaise, anorexia and weight loss  EYES:  WEARS   Glasses,NO  visual disturbance, irritation and icterus  HEENT:  negative for  hearing loss, tinnitus, ear drainage, earaches,HAS SINUS HEADACHES AS  nasal congestion, epistaxis, snoring, sore mouth, sore throat, hoarseness and voice change  RESPIRATORY:  negative for  dyspnea, wheezing, hemoptysis and cough  CARDIOVASCULAR:  Negative for chest pain, MVP, heart murmur, H/O Rheumatic fever, irregular heart, pacemaker/AICD, blood clots  GASTROINTESTINAL:  negative for nausea, vomiting, diarrhea, constipation, abdominal pain, HAS GERD AS reflux, hematemesis and hemtochezia  GENITOURINARY:  negative for frequency, dysuria, nocturia, urinary incontinence and hematuria  INTEGUMENT/BREAST:  negative for rash and skin lesion(s)  HEMATOLOGIC/LYMPHATIC:  negative for lymphadenopathy  ENDOCRINE:  negative for diabetes, thyroid irregularities  MUSCULOSKELETAL:  HAS BACK AND LEFT LEG   pain, joint swelling, stiff joints, decreased range of motion and muscle weakness  NEUROLOGICAL:  negative for headaches, dizziness, seizures, memory problems, gait problems, tremor, numbness, syncope, near syncope and history of head injury, stroke or TIA  BEHAVIOR/PSYCH:  negative for depressed mood, anxiety and substance abuse issues    EXAM:  /69  Pulse 63  Resp 16  Ht 5' 1.5\" (1.562 m)  Wt 123 lb 9 oz (56 kg)  SpO2 100%  BMI 22.97 kg/m2  /69  Pulse 63  Resp 16  Ht 5' 1.5\" (1.562 m)  Wt 123 lb 9 oz (56 kg)  SpO2 100%  BMI 22.97 kg/m2  General appearance: alert, appears stated age and cooperative  Head: Normocephalic, without obvious abnormality, atraumatic  Eyes: conjunctivae/corneas clear. PERRL, EOM's intact. Fundi benign. Nose: Nares normal. Septum midline. Mucosa normal. No drainage or sinus tenderness. Throat: lips, mucosa, and tongue normal; teeth and gums normal  Neck: no adenopathy, no carotid bruit, no JVD, supple, symmetrical, trachea midline and thyroid not enlarged, symmetric, no tenderness/mass/nodules  Back: symmetric, no curvature. ROM normal. No CVA tenderness.   Lungs: clear to auscultation bilaterally  Heart: regular rate and rhythm, S1, S2 normal, no murmur, click, rub or gallop  Abdomen: soft, non-tender; bowel sounds normal; no masses,  no organomegaly  Extremities: extremities normal, atraumatic, no cyanosis or edema  Pulses: 2+ and symmetric  Skin: Skin color, texture, turgor normal. No rashes or lesions  Neurologic: Alert and oriented X 3, normal strength and tone. Normal symmetric reflexes. Normal coordination and gait     Labs:  No results found for: HGB, HCT, WBC, PLATELET, NA, K, CL, CO2, BUN, CREATININE, GLUCOSE, INR    Lab Review:  CBC: No results found for: WBC, RBC, HGB, HCT, MCV, MCH, MCHC, RDW, PLT  BMP:  No results found for: GLUCOSE, NA, K, CL, CO2, ANIONGAP, BUN, CREATININE, BUNCRER, CALCIUM, LABGLOM, GFRAA, GFR  U/A:  No results found for: Janeece Charlene, SPECGRAV, HGBUR, PHUR, PROTEINU, GLUCOSEU, KETUA, BILIRUBINUR, UROBILINOGEN, NITRU, LEUKOCYTESUR    Provisional diagnosis:  Lumbar stenosis with degenerative disc disease and left radiculopathy    Plan:  1.  MINIMALLY INVASIVE TRANS-FORAMINAL LUMBAR INTERBODY FUSION LEFT L 5 S 1    See orders  Electronically signed by Nabila MILLER ACNP-BC on 10/4/2017 at 12:40 PM    Copy of H+P to Macey Nichols MD

## 2017-10-16 NOTE — ANESTHESIA POSTPROCEDURE EVALUATION
Department of Anesthesiology  Postprocedure Note    Patient: Pieter Roberts  MRN: [id number]  YOB: 1960  Date of evaluation: 10/16/2017  Time:  3:21 PM     Procedure Summary     Date:  10/16/17 Room / Location:  [facility] OR 05 / [facility]    Anesthesia Start:  1053 Anesthesia Stop:  1298    Procedure:  TRANSFORAMINAL LUMBAR FUSION MINIMALLY INVASIVE LEFT  L5S1  [place] (Left Back) Diagnosis:  (DX SPONDYLOLITHESIS & STENOSIS)    Surgeon:  Spero Bamberger, MD Responsible Provider:  Cheryl Finnegan MD    Anesthesia Type:  general ASA Status:  2          Anesthesia Type: general    Nawaf Phase I: Nawaf Score: 10    Nawaf Phase II:      Last vitals: Reviewed and per EMR flowsheets.        Anesthesia Post Evaluation    Patient location during evaluation: PACU  Level of consciousness: awake  Pain score: 2  Airway patency: patent  Nausea & Vomiting: no nausea and no vomiting  Complications: no  Cardiovascular status: blood pressure returned to baseline  Respiratory status: acceptable  Hydration status: euvolemic

## 2017-10-16 NOTE — ANESTHESIA PRE PROCEDURE
Department of Anesthesiology  Preprocedure Note       Name:  Rachel Lemos   Age:  62 y.o.  :  1960                                          MRN:  [id number]         Date:  10/16/2017      Surgeon: Casimiro Bowser):  Romeo Morse MD    Procedure: Procedure(s):  TRANSFORAMINAL LUMBAR FUSION MINIMALLY INVASIVE LEFT  L5S1  NUVASIVE    Medications prior to admission:   Prior to Admission medications    Medication Sig Start Date End Date Taking?  Authorizing Provider   aspirin-acetaminophen-caffeine (Everlene Lush) [phone] MG per tablet Take 1 tablet by mouth every 6 hours as needed for Headaches   Yes Historical Provider, MD   B Complex Vitamins (VITAMIN B COMPLEX PO) Take 1 tablet by mouth daily   Yes Historical Provider, MD   Magnesium 500 MG TABS Take 500 mg by mouth daily   Yes Historical Provider, MD   Multiple Vitamins-Minerals (MULTIVITAMIN & MINERAL PO) Take 1 tablet by mouth daily   Yes Historical Provider, MD   ibuprofen (ADVIL;MOTRIN) 200 MG tablet Take 200 mg by mouth every 6 hours as needed for Pain    Historical Provider, MD   OMEPRAZOLE PO Take 20 mg by mouth daily    Historical Provider, MD       Current medications:    Current Facility-Administered Medications   Medication Dose Route Frequency Provider Last Rate Last Dose    ceFAZolin (ANCEF) 2 g in dextrose 3 % 50 mL IVPB (duplex)  2 g Intravenous Once Romeo Morse MD        lactated ringers infusion   Intravenous Continuous Lucy Avitia  mL/hr at 10/16/17 0830      lidocaine 1% (buffered) injection 0.5 mL  0.5 mL Subdermal Once Lucy Avitia MD        fentaNYL (SUBLIMAZE) injection 25 mcg  25 mcg Intravenous Q5 Min PRN Johanny Valderrama MD        HYDROmorphone (DILAUDID) injection 0.5 mg  0.5 mg Intravenous Q5 Min PRN Johanny Valderrama MD        fentaNYL (SUBLIMAZE) injection 25 mcg  25 mcg Intravenous Q5 Min PRN Johanny Valderrama MD        HYDROmorphone (DILAUDID) injection 0.5 mg  0.5 mg Intravenous Q5 Min PRN Johanny Marcy Musa MD        diphenhydrAMINE (BENADRYL) injection 12.5 mg  12.5 mg Intravenous Once PRN Wili Timmons MD        ondansetron (ZOFRAN) injection 4 mg  4 mg Intravenous Once PRN Wili Timmons MD        dexamethasone (DECADRON) injection 4 mg  4 mg Intravenous Once PRN Wili Timmons MD        labetalol (NORMODYNE;TRANDATE) injection 5 mg  5 mg Intravenous Q10 Min PRN Johanny Musa MD        hydrALAZINE (APRESOLINE) injection 5 mg  5 mg Intravenous Q10 Min PRN Johanny Musa MD        meperidine (DEMEROL) injection 12.5 mg  12.5 mg Intravenous Q5 Min PRN Wili Timmons MD           Allergies:  No Known Allergies    Problem List:  There is no problem list on file for this patient. Past Medical History:  History reviewed. No pertinent past medical history. Past Surgical History:        Procedure Laterality Date     SECTION      x 3    COLONOSCOPY  2017    UPPER GASTROINTESTINAL ENDOSCOPY  2017       Social History:    Social History   Substance Use Topics    Smoking status: Never Smoker    Smokeless tobacco: Never Used    Alcohol use 1.8 oz/week     3 Shots of liquor per week                                Counseling given: Not Answered      Vital Signs (Current):   Vitals:    10/16/17 0759 10/16/17 0801   BP:  123/79   Pulse:  72   Resp:  14   Temp:  97.7 °F (36.5 °C)   TempSrc:  Oral   SpO2:  98%   Weight: 123 lb (55.8 kg)    Height: 5' 1\" (1.549 m)                                               BP Readings from Last 3 Encounters:   10/16/17 123/79   10/04/17 124/69       NPO Status: Time of last liquid consumption:                         Time of last solid consumption:                         Date of last liquid consumption: 10/15/17                        Date of last solid food consumption: 10/15/17    BMI:   Wt Readings from Last 3 Encounters:   10/16/17 123 lb (55.8 kg)   10/04/17 123 lb 9 oz (56 kg)     Body mass index is 23.24 kg/m².     CBC:

## 2017-10-16 NOTE — PROGRESS NOTES
Spoke with Dr. Pratt Flurrjolene in regards to patient's pain after receiving 2mg total of dilaudid and 25mcg of fentanyl. Pt stating her back is feeling really tight and her pain fluctuates from 5-9. Pt is ok to have 1g Ofirmev IV.

## 2017-10-17 VITALS
BODY MASS INDEX: 23.22 KG/M2 | RESPIRATION RATE: 16 BRPM | WEIGHT: 123 LBS | DIASTOLIC BLOOD PRESSURE: 64 MMHG | SYSTOLIC BLOOD PRESSURE: 109 MMHG | OXYGEN SATURATION: 100 % | HEIGHT: 61 IN | HEART RATE: 66 BPM | TEMPERATURE: 97.4 F

## 2017-10-17 LAB
ABSOLUTE EOS #: 0 K/UL (ref 0–0.4)
ABSOLUTE LYMPH #: 0.9 K/UL (ref 1–4.8)
ABSOLUTE MONO #: 0.5 K/UL (ref 0.2–0.8)
ANION GAP SERPL CALCULATED.3IONS-SCNC: 11 MMOL/L (ref 9–17)
BASOPHILS # BLD: 1 %
BASOPHILS ABSOLUTE: 0.1 K/UL (ref 0–0.2)
BUN BLDV-MCNC: 14 MG/DL (ref 6–20)
BUN/CREAT BLD: 19 (ref 9–20)
CALCIUM SERPL-MCNC: 9.3 MG/DL (ref 8.6–10.4)
CHLORIDE BLD-SCNC: 100 MMOL/L (ref 98–107)
CO2: 26 MMOL/L (ref 20–31)
CREAT SERPL-MCNC: 0.75 MG/DL (ref 0.5–0.9)
DIFFERENTIAL TYPE: ABNORMAL
EOSINOPHILS RELATIVE PERCENT: 0 %
GFR AFRICAN AMERICAN: >60 ML/MIN
GFR NON-AFRICAN AMERICAN: >60 ML/MIN
GFR SERPL CREATININE-BSD FRML MDRD: ABNORMAL ML/MIN/{1.73_M2}
GFR SERPL CREATININE-BSD FRML MDRD: ABNORMAL ML/MIN/{1.73_M2}
GLUCOSE BLD-MCNC: 135 MG/DL (ref 70–99)
HCT VFR BLD CALC: 36.1 % (ref 36–46)
HEMOGLOBIN: 12.2 G/DL (ref 12–16)
LYMPHOCYTES # BLD: 7 %
MAGNESIUM: 1.9 MG/DL (ref 1.6–2.6)
MCH RBC QN AUTO: 30.5 PG (ref 26–34)
MCHC RBC AUTO-ENTMCNC: 33.8 G/DL (ref 31–37)
MCV RBC AUTO: 90.2 FL (ref 80–100)
MONOCYTES # BLD: 4 %
PDW BLD-RTO: 13.1 % (ref 11.5–14.5)
PLATELET # BLD: 293 K/UL (ref 130–400)
PLATELET ESTIMATE: ABNORMAL
PMV BLD AUTO: 7.6 FL (ref 6–12)
POTASSIUM SERPL-SCNC: 4.9 MMOL/L (ref 3.7–5.3)
RBC # BLD: 4.01 M/UL (ref 4–5.2)
RBC # BLD: ABNORMAL 10*6/UL
SEG NEUTROPHILS: 88 %
SEGMENTED NEUTROPHILS ABSOLUTE COUNT: 10.9 K/UL (ref 1.8–7.7)
SODIUM BLD-SCNC: 137 MMOL/L (ref 135–144)
WBC # BLD: 12.4 K/UL (ref 3.5–11)
WBC # BLD: ABNORMAL 10*3/UL

## 2017-10-17 PROCEDURE — 97530 THERAPEUTIC ACTIVITIES: CPT

## 2017-10-17 PROCEDURE — G8978 MOBILITY CURRENT STATUS: HCPCS

## 2017-10-17 PROCEDURE — G8979 MOBILITY GOAL STATUS: HCPCS

## 2017-10-17 PROCEDURE — 97161 PT EVAL LOW COMPLEX 20 MIN: CPT

## 2017-10-17 PROCEDURE — 97116 GAIT TRAINING THERAPY: CPT

## 2017-10-17 PROCEDURE — 2580000003 HC RX 258: Performed by: PHYSICIAN ASSISTANT

## 2017-10-17 PROCEDURE — 80048 BASIC METABOLIC PNL TOTAL CA: CPT

## 2017-10-17 PROCEDURE — 6370000000 HC RX 637 (ALT 250 FOR IP): Performed by: PHYSICIAN ASSISTANT

## 2017-10-17 PROCEDURE — 83735 ASSAY OF MAGNESIUM: CPT

## 2017-10-17 PROCEDURE — 36415 COLL VENOUS BLD VENIPUNCTURE: CPT

## 2017-10-17 PROCEDURE — 6360000002 HC RX W HCPCS: Performed by: PHYSICIAN ASSISTANT

## 2017-10-17 PROCEDURE — 85025 COMPLETE CBC W/AUTO DIFF WBC: CPT

## 2017-10-17 RX ORDER — MORPHINE SULFATE 15 MG/1
15 TABLET, FILM COATED, EXTENDED RELEASE ORAL EVERY 12 HOURS SCHEDULED
Qty: 14 TABLET | Refills: 0 | Status: SHIPPED | OUTPATIENT
Start: 2017-10-17 | End: 2017-10-24

## 2017-10-17 RX ORDER — OXYCODONE HYDROCHLORIDE AND ACETAMINOPHEN 5; 325 MG/1; MG/1
TABLET ORAL
Qty: 80 TABLET | Refills: 0 | Status: SHIPPED | OUTPATIENT
Start: 2017-10-17 | End: 2022-05-17

## 2017-10-17 RX ORDER — DIAZEPAM 5 MG/1
5 TABLET ORAL EVERY 6 HOURS PRN
Qty: 60 TABLET | Refills: 0 | Status: SHIPPED | OUTPATIENT
Start: 2017-10-17 | End: 2017-10-27

## 2017-10-17 RX ORDER — PSEUDOEPHEDRINE HCL 30 MG
100 TABLET ORAL 2 TIMES DAILY
Qty: 60 CAPSULE | Refills: 0 | Status: SHIPPED | OUTPATIENT
Start: 2017-10-17

## 2017-10-17 RX ADMIN — MORPHINE SULFATE 15 MG: 15 TABLET, FILM COATED, EXTENDED RELEASE ORAL at 09:13

## 2017-10-17 RX ADMIN — Medication 10 ML: at 09:13

## 2017-10-17 RX ADMIN — DEXAMETHASONE SODIUM PHOSPHATE 6 MG: 10 INJECTION INTRAMUSCULAR; INTRAVENOUS at 02:51

## 2017-10-17 RX ADMIN — OXYCODONE HYDROCHLORIDE AND ACETAMINOPHEN 2 TABLET: 5; 325 TABLET ORAL at 01:57

## 2017-10-17 RX ADMIN — OXYCODONE HYDROCHLORIDE 5 MG: 5 TABLET ORAL at 12:59

## 2017-10-17 RX ADMIN — OXYCODONE HYDROCHLORIDE AND ACETAMINOPHEN 2 TABLET: 5; 325 TABLET ORAL at 06:38

## 2017-10-17 RX ADMIN — DOCUSATE SODIUM 100 MG: 100 CAPSULE, LIQUID FILLED ORAL at 09:13

## 2017-10-17 RX ADMIN — DEXAMETHASONE SODIUM PHOSPHATE 6 MG: 10 INJECTION INTRAMUSCULAR; INTRAVENOUS at 11:10

## 2017-10-17 RX ADMIN — CEFAZOLIN SODIUM 2 G: 2 SOLUTION INTRAVENOUS at 01:58

## 2017-10-17 RX ADMIN — PANTOPRAZOLE SODIUM 40 MG: 40 TABLET, DELAYED RELEASE ORAL at 05:14

## 2017-10-17 RX ADMIN — OXYCODONE HYDROCHLORIDE AND ACETAMINOPHEN 1 TABLET: 5; 325 TABLET ORAL at 11:01

## 2017-10-17 ASSESSMENT — PAIN DESCRIPTION - FREQUENCY
FREQUENCY: CONTINUOUS
FREQUENCY: CONTINUOUS

## 2017-10-17 ASSESSMENT — PAIN DESCRIPTION - ORIENTATION
ORIENTATION: LOWER
ORIENTATION: LOWER

## 2017-10-17 ASSESSMENT — PAIN DESCRIPTION - PAIN TYPE
TYPE: SURGICAL PAIN

## 2017-10-17 ASSESSMENT — PAIN SCALES - GENERAL
PAINLEVEL_OUTOF10: 5
PAINLEVEL_OUTOF10: 1
PAINLEVEL_OUTOF10: 3
PAINLEVEL_OUTOF10: 6
PAINLEVEL_OUTOF10: 2
PAINLEVEL_OUTOF10: 8
PAINLEVEL_OUTOF10: 5

## 2017-10-17 ASSESSMENT — PAIN DESCRIPTION - DESCRIPTORS
DESCRIPTORS: ACHING;SHARP
DESCRIPTORS: ACHING;SHARP;TIGHTNESS

## 2017-10-17 ASSESSMENT — PAIN DESCRIPTION - LOCATION
LOCATION: BACK

## 2017-10-17 ASSESSMENT — PAIN DESCRIPTION - ONSET
ONSET: ON-GOING
ONSET: ON-GOING

## 2017-10-17 NOTE — PLAN OF CARE
Problem: Pain:  Goal: Pain level will decrease  Pain level will decrease   Outcome: Ongoing    Goal: Control of acute pain  Control of acute pain   Outcome: Ongoing    Goal: Control of chronic pain  Control of chronic pain   Outcome: Ongoing      Problem: Falls - Risk of:  Goal: Will remain free from falls  Will remain free from falls   Outcome: Ongoing    Goal: Absence of physical injury  Absence of physical injury   Outcome: Ongoing      Problem: SAFETY  Goal: Free from accidental physical injury  Outcome: Ongoing    Goal: Free from intentional harm  Outcome: Completed Date Met: 10/17/17      Problem: DAILY CARE  Goal: Daily care needs are met  Outcome: Ongoing      Problem: PAIN  Goal: Patient's pain/discomfort is manageable  Outcome: Ongoing      Problem: SKIN INTEGRITY  Goal: Skin integrity is maintained or improved  Outcome: Ongoing      Problem: KNOWLEDGE DEFICIT  Goal: Patient/S.O. demonstrates understanding of disease process, treatment plan, medications, and discharge instructions.   Outcome: Ongoing      Problem: DISCHARGE BARRIERS  Goal: Patient's continuum of care needs are met  Outcome: Ongoing

## 2017-10-17 NOTE — PLAN OF CARE
Problem: Pain:  Goal: Pain level will decrease  Pain level will decrease   Outcome: Ongoing  Pt medicated with pain medication prn. Assessed all pain characteristics including level, type, location, frequency, and onset. Non-pharmacologic interventions offered to pt as well. Pt states pain is tolerable at this time. Will continue to monitor      Problem: Falls - Risk of  Goal: Absence of falls  Outcome: Ongoing  Patient is a fall risk during this admission. Fall risk assessment was performed. Patient is absent of falls. Bed is in the lowest position. Wheels on the bed are locked. Call light and bed side table are within reach. Clutter is removed. Patient was educated to call out when needing assistance or wanting to get out of bed. Patient offered toileting assistance during rounding. Hourly rounds have been performed.

## 2017-10-17 NOTE — PROGRESS NOTES
tablet 15 mg, 15 mg, Oral, 2 times per day  oxyCODONE (ROXICODONE) immediate release tablet 5 mg, 5 mg, Oral, Q4H PRN  sodium chloride flush 0.9 % injection 10 mL, 10 mL, Intravenous, 2 times per day  sodium chloride flush 0.9 % injection 10 mL, 10 mL, Intravenous, PRN  docusate sodium (COLACE) capsule 100 mg, 100 mg, Oral, BID  ondansetron (ZOFRAN) injection 4 mg, 4 mg, Intravenous, Q6H PRN  0.9 % sodium chloride infusion, , Intravenous, Continuous  oxyCODONE-acetaminophen (PERCOCET) 5-325 MG per tablet 1 tablet, 1 tablet, Oral, Q4H PRN **OR** oxyCODONE-acetaminophen (PERCOCET) 5-325 MG per tablet 2 tablet, 2 tablet, Oral, Q4H PRN  morphine (PF) injection 2 mg, 2 mg, Intravenous, Q2H PRN **OR** morphine (PF) injection 4 mg, 4 mg, Intravenous, Q2H PRN  diazepam (VALIUM) tablet 5 mg, 5 mg, Oral, Q6H PRN  pantoprazole (PROTONIX) tablet 40 mg, 40 mg, Oral, QAM AC    ASSESSMENT AND PLAN    62 y.o. female status post L5-S1 MIS TLIF/PSFI post op day # 1    1. PT- WBAT  2. Pain control  3. EPC  4.  D/C home later today after working with PT     Chepe Gee, SENAIT  [facility]  Physician Assistant to Dr. Spero Bamberger  [phone]

## 2017-10-17 NOTE — PROGRESS NOTES
Independent  Homemaking Assistance: Independent  Homemaking Responsibilities: Yes  Ambulation Assistance: Independent  Transfer Assistance: Independent  Active : Yes  Mode of Transportation: Car  Occupation: Full time employment  Type of occupation:   Objective          AROM RLE (degrees)  RLE AROM: WFL  AROM LLE (degrees)  LLE AROM : WFL  AROM RUE (degrees)  RUE AROM : WFL  AROM LUE (degrees)  LUE AROM : WFL  Strength RLE  Comment: 4/5 hip  Strength LLE  Comment: 4/5 hip   Strength RUE  Strength RUE: WFL  Strength LUE  Strength LUE: WFL  Coordination  Movements Are Fluid And Coordinated: Yes  Motor Control  Gross Motor?: WFL  Sensation  Overall Sensation Status: WFL  Bed mobility  Rolling to Left: Supervision  Rolling to Right: Supervision  Supine to Sit: Contact guard assistance  Sit to Supine: Contact guard assistance  Scooting: Contact guard assistance   Comment: Pt was Ed on back sparing technique for transfers in & OOB to maintain spinal precautions  Pt sat at EOB for 8 minutes to rest after bed mobility, Ed on donning of back brace,  initiated donning of back brace, & to prepare lines for standing. Transfers  Sit to Stand: Contact guard assistance,  Pt stood 3 minutes to stabilize feet underneath & to tighten & fit back brace   Stand to sit: Contact guard assistance  Bed to Chair: Contact guard assistance  Stand Pivot Transfers: Contact guard assistance  Comment: cues for safety  Ambulation  Ambulation?: Yes  Ambulation 1  Surface: level tile  Device: Rolling Walker  Assistance: Contact guard assistance  Quality of Gait: very slow & guarded , step to pattern  Distance: 75ft     Balance  Sitting - Static: Good  Sitting - Dynamic: Good  Standing - Static: Good;-  Standing - Dynamic: Fair;+    Pt sat to EOB after gait, rested 3 minutes then stood, ambulated 2 ft & assisted to chair. Cues to reach arms back to hold arms of chair & to sit safely.   Pt educated on home walking program. All lines intact, call light within reach, breakfast tray in front after meal arrival, and patient positioned comfortably at end of treatment. All patient needs addressed prior to ending therapy session. Assessment   Body structures, Functions, Activity limitations: Decreased functional mobility ; Decreased strength;Decreased endurance;Decreased balance  Pt requires continued PT at this time to improve strength, functional mobility independence, balance & activity tolerance to regain full independence. Prognosis: Excellent  Clinical Presentation: stable  Patient Education: PT POC, functional mobility, donning & doffing of back brace & spinal precautions  Barriers to Learning: none  REQUIRES PT FOLLOW UP: Yes  Activity Tolerance  Activity Tolerance: Patient limited by pain; Patient limited by endurance     Discharge Recommendations:  Home with assist PRN, Outpatient PT      Plan   Plan  Times per week: 1-2x/D, 5-6D/week  Current Treatment Recommendations: Strengthening, Functional Mobility Training, Endurance Training, Gait Training, Home Exercise Program, Safety Education & Training, Patient/Caregiver Education & Training  Safety Devices  Type of devices: Bed alarm in place, Call light within reach, Gait belt, Left in chair, Nurse notified    G-Code    Functional Assessment Tool Used: [facility] Physical Therapy Acute Care Functional Outcomes    Score = 19    Functional Limitation: Mobility & Walking Around    Mobility & Walking Around Current ZCNMJE():     CJ = At least 20% but less than 40% impaired, limited or restricted    Mobility & Moving Around Goal TADQSD():    CH:  0 % impaired, limited or restricted      OutComes Score                                           Goals  Short term goals  Time Frame for Short term goals: 8 visits  Short term goal 1: Inc bed-mobility & transfers to independent; Short term goal 2: Inc gait to amb 400ft or > independently;   Short term goal 3: Pt able to tolerate 30 min of activity to include 15-20 reps of ex & functional mobility to facilitate better activity tolerance; Short term goal 4: Ed on spinal precautions, bed mobility within spinal precautions, donning & doffing of lumbar corsett brace, posture, energy conservation & safety principles;   Patient Goals   Patient goals : return to being active, enjoy walking & my cardiovascular ex's       Therapy Time   Individual Concurrent Group Co-treatment   Time In  0730         Time Out  0809         Minutes  Sarwat 62, PT

## 2017-10-18 NOTE — DISCHARGE SUMMARY
Physician Discharge Summary     Patient ID:  Durga Chester  [id number]  87 y.o.  1960    Admit date: 10/16/2017    Discharge date and time: 10/17/2017  1:15 PM     Admitting Physician: Monica Lambert MD     Discharge Physician: Monica Lambert MD    Admission Diagnoses: Acquired spondylolisthesis [M43.10]    Discharge Diagnoses: Acquired spondylolisthesis [facility]    Hospital Course: Patient underwent L5-S1 MIS TLIF/PSFI and was admitted to hospital for post op pain managementy and physical therapy. She progressed well and was discharged to home in good condition on post op day #1.     Consults:  none    Treatments: antibiotics-ancef, steroids-decadron, analgesia-morphine, therapies- skilled nursing and PT    Disposition: home    Patient Instructions:   Meds: see list  Activity: WBAT  Diet: regular  Wound Care: dry dressing daily x 1wk    Follow-up 2 weeks  Signed:  [facility]  Physician Assistant to Dr. Monica Lambert  [phone]    10/18/2017  4:05 PM

## 2017-10-20 LAB
ABO/RH: NORMAL
ANTIBODY SCREEN: NEGATIVE
ARM BAND NUMBER: NORMAL
BLD PROD TYP BPU: NORMAL
BLD PROD TYP BPU: NORMAL
CROSSMATCH RESULT: NORMAL
CROSSMATCH RESULT: NORMAL
DISPENSE STATUS BLOOD BANK: NORMAL
DISPENSE STATUS BLOOD BANK: NORMAL
EXPIRATION DATE: NORMAL
TRANSFUSION STATUS: NORMAL
TRANSFUSION STATUS: NORMAL
UNIT DIVISION: 0
UNIT DIVISION: 0
UNIT NUMBER: NORMAL
UNIT NUMBER: NORMAL

## 2017-10-23 NOTE — OP NOTE
was used to complete the Stahl laminectomy as well  as Kerrison and curettes to expose the underlying dura. I removed the  ligamentum flavum and exposed the exiting L5 and traversing S1 nerve  roots in their entirety so that they were completely freed up. Once  the Stahl laminectomy was complete, nerve root retractor was placed  epidural hemostasis was achieved. Disc was then fully prepped with  removal of the L5-S1 disc as well as the cartilaginous end plates. Sizing was performed, which showed that an 8 x 10 x 25 mm, 4-degree  cage would be appropriate. This bone graft was then packed in to the  disc space as well as the cage and the cage was inserted under C-arm  guidance until it was fully seated at the L5-S1 level. At this point,  the hemostasis was once again achieved. Posterior lateral dissection  was then performed exposing the transverse process of L5 and the  sacral ala. These areas were then burred. The wound was then  irrigated with sterile normal saline with Bacitracin. The remainder  of the bone graft was then placed in the posterior lateral gutter from  L5-S1 for posterior lateral fusion. While once this was done, Gretchen Roach  was used to confirm that the decompression was complete, which it was. Shims were removed from the screw head. The screw heads with towers  were then placed and the screws were then fully seated. The retractor  was then removed. Rods were then measured and placed percutaneously  through the tower devices bilaterally and set screws were then placed  and subsequently finally tightened at the L5-S1 level. The C-arm was  brought in and confirmed the rods to be appropriate length. [place]  devices were removed as well as the reba inserted. The wounds were  then irrigated with sterile normal saline with bacitracin. Final  pictures were taken, which showed all instrumentation remain in  excellent position at L5-S1. Vancomycin powder was then placed within  the wounds. Closure was then performed with 0-Vicryl, 2-0 Vicryl, and  a running 4-0 Monocryl suture with Dermabond glue. Standard dressings  were then applied. She was then placed supine on her bed, extubated,  and taken to recovery room in stable condition.       Meghann Garcia      D: 10/22/2017 11:50:29       T: 10/22/2017 14:15:05     [id number]  Job#: [id number]     Doc#: [id number]

## 2017-10-26 NOTE — OP NOTE
[address] [facility] [address]               [address] [facility] [place], [address] [facility]                               OPERATIVE REPORT    PATIENT NAME: Curtis Sparks                     :             1960  MED REC NO:   [id number]                              ROOM:            2108  ACCOUNT NO:   [de-identified]                            ADMISSION DATE:  10/16/2017  PROVIDER:     Mónica Morfin    Corrected copy for addition of surgical assistant on 10/26/2017 [facility])    DATE OF PROCEDURE:  10/16/2017. SURGEON:  Mónica Morfin M.D.    ASSISTANT: Killian Martins PA-C    ANESTHESIA:  General endotracheal anesthesia. PREOPERATIVE DIAGNOSES:  1. L5-S1 spondylolisthesis with stenosis. 2.  Degenerative scoliosis. POSTOPERATIVE DIAGNOSES:  1. L5-S1 spondylolisthesis with stenosis. 2.  Degenerative scoliosis. PROCEDURES:  1. L5-S1 minimally invasive transforaminal lumbar interbody fusion  with posterior spinal fusion. 2.  Insertion of interbody cage for spinal fusion at L5-S1.  3.  Left L5 Stahl laminectomy for spinal decompression. 4.  Insertion of posterior spinal instrumentation at L5-S1 utilizing  NuVasive Reline pedicle screws and rods. 5.  Harvested of local bone for spinal fusion. 6.  Use of allograft bone for spinal fusion to include demineralized  bone matrix and crushed cancellous allograft bone. 7.  Intraoperative use of C-arm fluoroscopy. ESTIMATED BLOOD LOSS:  150 mL. FLUIDS:  Per anesthesia record. COMPLICATIONS:  None. INDICATIONS:  This is a pleasant 42-year-old female with a  longstanding history of severe low back pain as well as pain radiating  into her left lower extremity. She had done extensive conservative  management to include physical therapy, medications, and pain  management, all with minimal longstanding benefit. X-rays and MRI  were performed, which did show spondylolisthesis and stenosis at L5-S1  as well as a degenerative scoliosis. Due to the fact that she had  these continuing symptoms and failure of conservative management it  was discussed with her the option of performing a L5-S1 decompression  and fusion in an attempt to help alleviate her symptoms. Risks were  discussed including bleeding, infection, injury to nerves or vessels,  anesthetic risk, need for possible further future surgery as well as  the possibility for continued pain, continued symptoms, possible  nonunion and possible dural tear. She did understand all these risks  and wished to proceed and informed consent was obtained. OPERATIVE PROCEDURE:  The patient was taken to the operating room and  kept supine on her bed. She was intubated and placed under general  anesthesia per the anesthesiologist.  She was given preoperative  antibiotic prophylaxis. A Mendieta catheter was placed. Neuromonitoring  leads were placed in her bilateral lower extremities. She was then  placed prone on the [facility] table. All bony prominences were padded,  eyes were kept free of any pressure, and brachial plexus and elbows  were padded as well. Back was then prepped and draped in a sterile  fashion. Biplanar C-arm fluoroscopy was brought in and localized over  the L5-S1 level. Marking pen was used to cuong out the pedicles at  L5-S1.  0.5% Marcaine with epinephrine was used to infiltrate these  areas and the skin and approximately 3 cm Regan incisions were made  lateral to the pedicles at L5-S1 area and down to the lumbar fascia. At this point, pedicle screws were placed percutaneously in a standard  fashion utilizing Jamshidi needles, guidewires with biplanar  fluoroguidance and neuromonitoring guidance. These pedicle screws  were placed without incident at L5 and S1 bilaterally and were in good  position. At this point, the retractor was assembled on the left  L5-S1 level and attached to the table arm and medial blade was  inserted.   At this point, the interarticular process was removed with  an osteotome and mallet at L5 and this bone was harvest for later use. A bur with bone trap was used to complete the Stahl laminectomy as well  as Kerrison and curettes to expose the underlying dura. I removed the  ligamentum flavum and exposed the exiting L5 and traversing S1 nerve  roots in their entirety so that they were completely freed up. Once  the Stahl laminectomy was complete, nerve root retractor was placed  epidural hemostasis was achieved. Disc was then fully prepped with  removal of the L5-S1 disc as well as the cartilaginous end plates. Sizing was performed, which showed that an 8 x 10 x 25 mm, 4-degree  cage would be appropriate. This bone graft was then packed in to the  disc space as well as the cage and the cage was inserted under C-arm  guidance until it was fully seated at the L5-S1 level. At this point,  the hemostasis was once again achieved. Posterior lateral dissection  was then performed exposing the transverse process of L5 and the  sacral ala. These areas were then burred. The wound was then  irrigated with sterile normal saline with Bacitracin. The remainder  of the bone graft was then placed in the posterior lateral gutter from  L5-S1 for posterior lateral fusion. While once this was done, Ailyn Dent  was used to confirm that the decompression was complete, which it was. Shims were removed from the screw head. The screw heads with towers  were then placed and the screws were then fully seated. The retractor  was then removed. Rods were then measured and placed percutaneously  through the tower devices bilaterally and set screws were then placed  and subsequently finally tightened at the L5-S1 level. The C-arm was  brought in and confirmed the rods to be appropriate length. [place]  devices were removed as well as the rbea inserted. The wounds were  then irrigated with sterile normal saline with bacitracin.   Final  pictures were taken, which showed all instrumentation remain in  excellent position at L5-S1. Vancomycin powder was then placed within  the wounds. Closure was then performed with 0-Vicryl, 2-0 Vicryl, and  a running 4-0 Monocryl suture with Dermabond glue. Standard dressings  were then applied. She was then placed supine on her bed, extubated,  and taken to recovery room in stable condition.       Carla Jackman      D: 10/22/2017 11:50:29       T: 10/22/2017 14:15:05     [id number]  Job#: [id number]     Doc#: [id number]

## 2021-11-09 LAB — MAMMOGRAPHY, EXTERNAL: NORMAL

## 2022-05-17 PROBLEM — K21.9 ACID REFLUX: Status: ACTIVE | Noted: 2022-05-17

## 2022-05-17 PROBLEM — M41.50 OTHER SECONDARY SCOLIOSIS, SITE UNSPECIFIED: Status: ACTIVE | Noted: 2022-05-17

## 2022-05-18 ENCOUNTER — HOSPITAL ENCOUNTER (OUTPATIENT)
Age: 62
Setting detail: SPECIMEN
Discharge: HOME | End: 2022-05-18

## 2022-05-18 DIAGNOSIS — Z00.00 ROUTINE ADULT HEALTH MAINTENANCE: ICD-10-CM

## 2022-05-18 DIAGNOSIS — Z11.59 ENCOUNTER FOR HEPATITIS C SCREENING TEST FOR LOW RISK PATIENT: ICD-10-CM

## 2022-05-18 DIAGNOSIS — K21.9 GASTROESOPHAGEAL REFLUX DISEASE WITHOUT ESOPHAGITIS: ICD-10-CM

## 2022-05-18 DIAGNOSIS — Z13.29 THYROID DISORDER SCREEN: ICD-10-CM

## 2022-05-18 DIAGNOSIS — Z76.89 ESTABLISHING CARE WITH NEW DOCTOR, ENCOUNTER FOR: ICD-10-CM

## 2022-05-18 DIAGNOSIS — M48.061 SPINAL STENOSIS OF LUMBAR REGION, UNSPECIFIED WHETHER NEUROGENIC CLAUDICATION PRESENT: ICD-10-CM

## 2022-05-18 DIAGNOSIS — Z11.4 ENCOUNTER FOR SCREENING FOR HIV: ICD-10-CM

## 2022-05-18 DIAGNOSIS — Z13.21 ENCOUNTER FOR VITAMIN DEFICIENCY SCREENING: ICD-10-CM

## 2022-05-18 DIAGNOSIS — Z13.220 SCREENING CHOLESTEROL LEVEL: ICD-10-CM

## 2022-05-18 DIAGNOSIS — Z86.19 HISTORY OF HELICOBACTER PYLORI INFECTION: ICD-10-CM

## 2022-05-18 LAB
ABSOLUTE EOS #: 0.39 K/UL (ref 0–0.44)
ABSOLUTE IMMATURE GRANULOCYTE: <0.03 K/UL (ref 0–0.3)
ABSOLUTE LYMPH #: 1.74 K/UL (ref 1.1–3.7)
ABSOLUTE MONO #: 0.6 K/UL (ref 0.1–1.2)
ALBUMIN SERPL-MCNC: 4.1 G/DL (ref 3.5–5.2)
ALBUMIN/GLOBULIN RATIO: 1.6 (ref 1–2.5)
ALP BLD-CCNC: 117 U/L (ref 35–104)
ALT SERPL-CCNC: 26 U/L (ref 5–33)
ANION GAP SERPL CALCULATED.3IONS-SCNC: 15 MMOL/L (ref 9–17)
AST SERPL-CCNC: 38 U/L
BASOPHILS # BLD: 2 % (ref 0–2)
BASOPHILS ABSOLUTE: 0.12 K/UL (ref 0–0.2)
BILIRUB SERPL-MCNC: 0.21 MG/DL (ref 0.3–1.2)
BUN BLDV-MCNC: 13 MG/DL (ref 8–23)
CALCIUM SERPL-MCNC: 9.5 MG/DL (ref 8.6–10.4)
CHLORIDE BLD-SCNC: 101 MMOL/L (ref 98–107)
CHOLESTEROL, FASTING: 193 MG/DL
CHOLESTEROL/HDL RATIO: 2.9
CO2: 22 MMOL/L (ref 20–31)
CREAT SERPL-MCNC: 0.83 MG/DL (ref 0.5–0.9)
EOSINOPHILS RELATIVE PERCENT: 6 % (ref 1–4)
GFR AFRICAN AMERICAN: >60 ML/MIN
GFR NON-AFRICAN AMERICAN: >60 ML/MIN
GFR SERPL CREATININE-BSD FRML MDRD: ABNORMAL ML/MIN/{1.73_M2}
GLUCOSE BLD-MCNC: 69 MG/DL (ref 70–99)
HCT VFR BLD CALC: 41.7 % (ref 36.3–47.1)
HDLC SERPL-MCNC: 66 MG/DL
HEMOGLOBIN: 13.3 G/DL (ref 11.9–15.1)
HEPATITIS C ANTIBODY: NONREACTIVE
HIV AG/AB: NONREACTIVE
IMMATURE GRANULOCYTES: 0 %
LDL CHOLESTEROL: 108 MG/DL (ref 0–130)
LYMPHOCYTES # BLD: 26 % (ref 24–43)
MCH RBC QN AUTO: 29.1 PG (ref 25.2–33.5)
MCHC RBC AUTO-ENTMCNC: 31.9 G/DL (ref 28.4–34.8)
MCV RBC AUTO: 91.2 FL (ref 82.6–102.9)
MONOCYTES # BLD: 9 % (ref 3–12)
NRBC AUTOMATED: 0 PER 100 WBC
PDW BLD-RTO: 12.8 % (ref 11.8–14.4)
PLATELET # BLD: 315 K/UL (ref 138–453)
PMV BLD AUTO: 10.6 FL (ref 8.1–13.5)
POTASSIUM SERPL-SCNC: 4 MMOL/L (ref 3.7–5.3)
RBC # BLD: 4.57 M/UL (ref 3.95–5.11)
SEG NEUTROPHILS: 57 % (ref 36–65)
SEGMENTED NEUTROPHILS ABSOLUTE COUNT: 3.71 K/UL (ref 1.5–8.1)
SODIUM BLD-SCNC: 138 MMOL/L (ref 135–144)
TOTAL PROTEIN: 6.6 G/DL (ref 6.4–8.3)
TRIGLYCERIDE, FASTING: 95 MG/DL
TSH SERPL DL<=0.05 MIU/L-ACNC: 2.17 UIU/ML (ref 0.3–5)
VITAMIN D 25-HYDROXY: 43.4 NG/ML
WBC # BLD: 6.6 K/UL (ref 3.5–11.3)

## 2022-05-19 LAB
DIRECT EXAM: NORMAL
SPECIMEN DESCRIPTION: NORMAL

## 2022-11-21 ENCOUNTER — HOSPITAL ENCOUNTER (OUTPATIENT)
Dept: GENERAL RADIOLOGY | Facility: CLINIC | Age: 62
Discharge: HOME | End: 2022-11-23
Payer: COMMERCIAL

## 2022-11-21 ENCOUNTER — HOSPITAL ENCOUNTER (OUTPATIENT)
Facility: CLINIC | Age: 62
Discharge: HOME | End: 2022-11-23
Payer: COMMERCIAL

## 2022-11-21 DIAGNOSIS — R20.0 NUMBNESS AND TINGLING IN LEFT ARM: ICD-10-CM

## 2022-11-21 DIAGNOSIS — M54.2 NECK PAIN: ICD-10-CM

## 2022-11-21 DIAGNOSIS — M48.061 SPINAL STENOSIS OF LUMBAR REGION, UNSPECIFIED WHETHER NEUROGENIC CLAUDICATION PRESENT: ICD-10-CM

## 2022-11-21 DIAGNOSIS — R20.2 NUMBNESS AND TINGLING IN LEFT ARM: ICD-10-CM

## 2022-11-21 PROCEDURE — 72040 X-RAY EXAM NECK SPINE 2-3 VW: CPT

## 2023-05-22 ENCOUNTER — HOSPITAL ENCOUNTER (OUTPATIENT)
Age: 63
Setting detail: SPECIMEN
Discharge: HOME | End: 2023-05-22

## 2023-05-22 DIAGNOSIS — M47.818 SI JOINT ARTHRITIS: ICD-10-CM

## 2023-05-22 DIAGNOSIS — E55.9 VITAMIN D DEFICIENCY: ICD-10-CM

## 2023-05-22 DIAGNOSIS — Z00.00 ROUTINE ADULT HEALTH MAINTENANCE: ICD-10-CM

## 2023-05-22 DIAGNOSIS — Z00.00 WELL ADULT EXAM: ICD-10-CM

## 2023-05-22 DIAGNOSIS — M81.0 OSTEOPOROSIS WITHOUT CURRENT PATHOLOGICAL FRACTURE, UNSPECIFIED OSTEOPOROSIS TYPE: ICD-10-CM

## 2023-05-22 DIAGNOSIS — Z13.220 ENCOUNTER FOR SCREENING FOR LIPID DISORDER: ICD-10-CM

## 2023-05-22 DIAGNOSIS — K21.9 GASTROESOPHAGEAL REFLUX DISEASE WITHOUT ESOPHAGITIS: ICD-10-CM

## 2023-05-22 LAB
25(OH)D3 SERPL-MCNC: 46 NG/ML
ALBUMIN SERPL-MCNC: 4.2 G/DL (ref 3.5–5.2)
ALBUMIN/GLOB SERPL: 1.5 {RATIO} (ref 1–2.5)
ALP SERPL-CCNC: 118 U/L (ref 35–104)
ALT SERPL-CCNC: 24 U/L (ref 5–33)
ANION GAP SERPL CALCULATED.3IONS-SCNC: 18 MMOL/L (ref 9–17)
AST SERPL-CCNC: 31 U/L
BASOPHILS # BLD: 0.13 K/UL (ref 0–0.2)
BASOPHILS NFR BLD: 2 % (ref 0–2)
BILIRUB SERPL-MCNC: 0.2 MG/DL (ref 0.3–1.2)
BUN SERPL-MCNC: 20 MG/DL (ref 8–23)
CALCIUM SERPL-MCNC: 9.4 MG/DL (ref 8.6–10.4)
CHLORIDE SERPL-SCNC: 104 MMOL/L (ref 98–107)
CHOLEST SERPL-MCNC: 200 MG/DL
CHOLESTEROL/HDL RATIO: 2.8
CO2 SERPL-SCNC: 21 MMOL/L (ref 20–31)
CREAT SERPL-MCNC: 0.92 MG/DL (ref 0.5–0.9)
EOSINOPHIL # BLD: 0.38 K/UL (ref 0–0.44)
EOSINOPHILS RELATIVE PERCENT: 6 % (ref 1–4)
ERYTHROCYTE [DISTWIDTH] IN BLOOD BY AUTOMATED COUNT: 12.7 % (ref 11.8–14.4)
GFR SERPL CREATININE-BSD FRML MDRD: >60 ML/MIN/1.73M2
GLUCOSE SERPL-MCNC: 73 MG/DL (ref 70–99)
HCT VFR BLD AUTO: 41.7 % (ref 36.3–47.1)
HDLC SERPL-MCNC: 72 MG/DL
HGB BLD-MCNC: 13.1 G/DL (ref 11.9–15.1)
IMM GRANULOCYTES # BLD AUTO: <0.03 K/UL (ref 0–0.3)
IMM GRANULOCYTES NFR BLD: 0 %
LDLC SERPL CALC-MCNC: 111 MG/DL (ref 0–130)
LYMPHOCYTES # BLD: 38 % (ref 24–43)
LYMPHOCYTES NFR BLD: 2.26 K/UL (ref 1.1–3.7)
MCH RBC QN AUTO: 28.9 PG (ref 25.2–33.5)
MCHC RBC AUTO-ENTMCNC: 31.4 G/DL (ref 28.4–34.8)
MCV RBC AUTO: 92.1 FL (ref 82.6–102.9)
MONOCYTES NFR BLD: 0.48 K/UL (ref 0.1–1.2)
MONOCYTES NFR BLD: 8 % (ref 3–12)
NEUTROPHILS NFR BLD: 46 % (ref 36–65)
NEUTS SEG NFR BLD: 2.67 K/UL (ref 1.5–8.1)
NRBC AUTOMATED: 0 PER 100 WBC
PLATELET # BLD AUTO: 358 K/UL (ref 138–453)
PMV BLD AUTO: 10.1 FL (ref 8.1–13.5)
POTASSIUM SERPL-SCNC: 4.5 MMOL/L (ref 3.7–5.3)
PROT SERPL-MCNC: 7 G/DL (ref 6.4–8.3)
RBC # BLD AUTO: 4.53 M/UL (ref 3.95–5.11)
SODIUM SERPL-SCNC: 143 MMOL/L (ref 135–144)
TRIGL SERPL-MCNC: 84 MG/DL
WBC OTHER # BLD: 5.9 K/UL (ref 3.5–11.3)

## 2024-05-22 ENCOUNTER — HOSPITAL ENCOUNTER (OUTPATIENT)
Age: 64
Setting detail: SPECIMEN
Discharge: HOME | End: 2024-05-22

## 2024-05-22 DIAGNOSIS — Z00.00 WELL ADULT EXAM: ICD-10-CM

## 2024-05-22 DIAGNOSIS — E78.00 HYPERCHOLESTEREMIA: ICD-10-CM

## 2024-05-22 DIAGNOSIS — M15.9 PRIMARY OSTEOARTHRITIS INVOLVING MULTIPLE JOINTS: ICD-10-CM

## 2024-05-22 DIAGNOSIS — R73.09 ELEVATED GLUCOSE: ICD-10-CM

## 2024-05-22 DIAGNOSIS — Z00.00 ROUTINE ADULT HEALTH MAINTENANCE: ICD-10-CM

## 2024-05-22 LAB
ALBUMIN SERPL-MCNC: 4.2 G/DL (ref 3.5–5.2)
ALBUMIN/GLOB SERPL: 2 {RATIO} (ref 1–2.5)
ALP SERPL-CCNC: 124 U/L (ref 35–104)
ALT SERPL-CCNC: 21 U/L (ref 10–35)
ANION GAP SERPL CALCULATED.3IONS-SCNC: 11 MMOL/L (ref 9–16)
AST SERPL-CCNC: 28 U/L (ref 10–35)
BASOPHILS # BLD: 0.13 K/UL (ref 0–0.2)
BASOPHILS NFR BLD: 2 % (ref 0–2)
BILIRUB SERPL-MCNC: 0.3 MG/DL (ref 0–1.2)
BUN SERPL-MCNC: 19 MG/DL (ref 8–23)
CALCIUM SERPL-MCNC: 9.1 MG/DL (ref 8.6–10.4)
CHLORIDE SERPL-SCNC: 103 MMOL/L (ref 98–107)
CHOLEST SERPL-MCNC: 214 MG/DL (ref 0–199)
CHOLESTEROL/HDL RATIO: 3
CO2 SERPL-SCNC: 27 MMOL/L (ref 20–31)
CREAT SERPL-MCNC: 0.9 MG/DL (ref 0.5–0.9)
EOSINOPHIL # BLD: 0.49 K/UL (ref 0–0.44)
EOSINOPHILS RELATIVE PERCENT: 8 % (ref 1–4)
ERYTHROCYTE [DISTWIDTH] IN BLOOD BY AUTOMATED COUNT: 12.5 % (ref 11.8–14.4)
EST. AVERAGE GLUCOSE BLD GHB EST-MCNC: 105 MG/DL
GFR, ESTIMATED: 73 ML/MIN/1.73M2
GLUCOSE SERPL-MCNC: 79 MG/DL (ref 74–99)
HBA1C MFR BLD: 5.3 % (ref 4–6)
HCT VFR BLD AUTO: 43.4 % (ref 36.3–47.1)
HDLC SERPL-MCNC: 74 MG/DL
HGB BLD-MCNC: 14.2 G/DL (ref 11.9–15.1)
IMM GRANULOCYTES # BLD AUTO: <0.03 K/UL (ref 0–0.3)
IMM GRANULOCYTES NFR BLD: 0 %
LDLC SERPL CALC-MCNC: 120 MG/DL (ref 0–100)
LYMPHOCYTES NFR BLD: 2.55 K/UL (ref 1.1–3.7)
LYMPHOCYTES RELATIVE PERCENT: 42 % (ref 24–43)
MCH RBC QN AUTO: 29.5 PG (ref 25.2–33.5)
MCHC RBC AUTO-ENTMCNC: 32.7 G/DL (ref 28.4–34.8)
MCV RBC AUTO: 90.2 FL (ref 82.6–102.9)
MONOCYTES NFR BLD: 0.57 K/UL (ref 0.1–1.2)
MONOCYTES NFR BLD: 10 % (ref 3–12)
NEUTROPHILS NFR BLD: 38 % (ref 36–65)
NEUTS SEG NFR BLD: 2.27 K/UL (ref 1.5–8.1)
NRBC BLD-RTO: 0 PER 100 WBC
PLATELET # BLD AUTO: 323 K/UL (ref 138–453)
PMV BLD AUTO: 10.4 FL (ref 8.1–13.5)
POTASSIUM SERPL-SCNC: 4.4 MMOL/L (ref 3.7–5.3)
PROT SERPL-MCNC: 6.9 G/DL (ref 6.6–8.7)
RBC # BLD AUTO: 4.81 M/UL (ref 3.95–5.11)
SODIUM SERPL-SCNC: 141 MMOL/L (ref 136–145)
TRIGL SERPL-MCNC: 104 MG/DL
VLDLC SERPL CALC-MCNC: 21 MG/DL
WBC OTHER # BLD: 6 K/UL (ref 3.5–11.3)

## 2024-05-28 ENCOUNTER — HOSPITAL ENCOUNTER (OUTPATIENT)
Dept: WOMENS IMAGING | Age: 64
Discharge: HOME | End: 2024-05-30
Payer: COMMERCIAL

## 2024-05-28 DIAGNOSIS — M81.0 AGE-RELATED OSTEOPOROSIS WITHOUT CURRENT PATHOLOGICAL FRACTURE: ICD-10-CM

## 2024-05-28 PROCEDURE — 77080 DXA BONE DENSITY AXIAL: CPT

## 2025-05-23 ENCOUNTER — HOSPITAL ENCOUNTER (OUTPATIENT)
Age: 65
Setting detail: SPECIMEN
Discharge: HOME | End: 2025-05-23

## 2025-05-23 DIAGNOSIS — E55.9 VITAMIN D DEFICIENCY: ICD-10-CM

## 2025-05-23 DIAGNOSIS — Z00.00 WELL ADULT EXAM: ICD-10-CM

## 2025-05-23 DIAGNOSIS — E78.00 HYPERCHOLESTEREMIA: ICD-10-CM

## 2025-05-23 DIAGNOSIS — M15.0 PRIMARY OSTEOARTHRITIS INVOLVING MULTIPLE JOINTS: ICD-10-CM

## 2025-05-23 DIAGNOSIS — Z01.89 ENCOUNTER FOR ROUTINE LABORATORY TESTING: ICD-10-CM

## 2025-05-23 LAB
25(OH)D3 SERPL-MCNC: 36.8 NG/ML (ref 30–100)
ALBUMIN SERPL-MCNC: 4 G/DL (ref 3.5–5.2)
ALBUMIN/GLOB SERPL: 1.5 {RATIO} (ref 1–2.5)
ALP SERPL-CCNC: 156 U/L (ref 35–104)
ALT SERPL-CCNC: 28 U/L (ref 10–35)
ANION GAP SERPL CALCULATED.3IONS-SCNC: 10 MMOL/L (ref 9–16)
AST SERPL-CCNC: 30 U/L (ref 10–35)
BILIRUB SERPL-MCNC: 0.3 MG/DL (ref 0–1.2)
BUN SERPL-MCNC: 24 MG/DL (ref 8–23)
CALCIUM SERPL-MCNC: 9.3 MG/DL (ref 8.6–10.4)
CHLORIDE SERPL-SCNC: 100 MMOL/L (ref 98–107)
CHOLEST SERPL-MCNC: 158 MG/DL (ref 0–199)
CHOLESTEROL/HDL RATIO: 2.2
CO2 SERPL-SCNC: 26 MMOL/L (ref 20–31)
CREAT SERPL-MCNC: 0.8 MG/DL (ref 0.6–0.9)
ERYTHROCYTE [DISTWIDTH] IN BLOOD BY AUTOMATED COUNT: 12.9 % (ref 11.8–14.4)
GFR, ESTIMATED: 82 ML/MIN/1.73M2
GLUCOSE SERPL-MCNC: 77 MG/DL (ref 74–99)
HCT VFR BLD AUTO: 42.8 % (ref 36.3–47.1)
HDLC SERPL-MCNC: 71 MG/DL
HGB BLD-MCNC: 14 G/DL (ref 11.9–15.1)
LDLC SERPL CALC-MCNC: 71 MG/DL (ref 0–100)
MCH RBC QN AUTO: 29.8 PG (ref 25.2–33.5)
MCHC RBC AUTO-ENTMCNC: 32.7 G/DL (ref 28.4–34.8)
MCV RBC AUTO: 91.1 FL (ref 82.6–102.9)
NRBC BLD-RTO: 0 PER 100 WBC
PLATELET # BLD AUTO: 311 K/UL (ref 138–453)
PMV BLD AUTO: 10 FL (ref 8.1–13.5)
POTASSIUM SERPL-SCNC: 4.4 MMOL/L (ref 3.7–5.3)
PROT SERPL-MCNC: 6.7 G/DL (ref 6.6–8.7)
RBC # BLD AUTO: 4.7 M/UL (ref 3.95–5.11)
SODIUM SERPL-SCNC: 136 MMOL/L (ref 136–145)
TRIGL SERPL-MCNC: 80 MG/DL
VLDLC SERPL CALC-MCNC: 16 MG/DL (ref 1–30)
WBC OTHER # BLD: 5.2 K/UL (ref 3.5–11.3)

## (undated) DEVICE — COAXIAL HIGH FLOW TIP WITH SOFT SHIELD

## (undated) DEVICE — CORD,CAUTERY,BIPOLAR,STERILE: Brand: MEDLINE

## (undated) DEVICE — DRESSING GZ W3XL16IN CELOS ACETT OIL EMUL N ADH

## (undated) DEVICE — 4.0MM X-COARSE DIAMOND, EXTENDED

## (undated) DEVICE — ADHESIVE SKIN CLSR 0.7ML TOP DERMBND ADV

## (undated) DEVICE — 3M™ WARMING BLANKET, UPPER BODY, 10 PER CASE, 42268: Brand: BAIR HUGGER™

## (undated) DEVICE — HYPODERMIC SAFETY NEEDLE: Brand: MAGELLAN

## (undated) DEVICE — 3M(TM) MEDIPORE(TM) +PAD SOFT CLOTH ADHESIVE WOUND DRESSING 3569: Brand: 3M™ MEDIPORE™

## (undated) DEVICE — MEDI-VAC SUCTION HANDLE REGULAR CAPACITY: Brand: CARDINAL HEALTH

## (undated) DEVICE — MEDI-VAC NON-CONDUCTIVE SUCTION TUBING: Brand: CARDINAL HEALTH

## (undated) DEVICE — 4.0MM PRECISION ROUND

## (undated) DEVICE — CUSHION PRONEVIEW L HD NK FOAM

## (undated) DEVICE — BLADE ES L6IN ELASTOMERIC COAT EXT DURABLE BEND UPTO 90DEG

## (undated) DEVICE — DECANTER FLD 9IN ST BG FOR ASEP TRNSF OF FLD

## (undated) DEVICE — K WIRE FIX NIT BVL BLNT TIP PRECEPT
Type: IMPLANTABLE DEVICE | Site: BACK | Status: NON-FUNCTIONAL
Removed: 2017-10-16

## (undated) DEVICE — GOWN,AURORA,NON-REINFORCED,2XL: Brand: MEDLINE

## (undated) DEVICE — THE MILL DISPOSABLE - MEDIUM

## (undated) DEVICE — INTENDED FOR TISSUE SEPARATION, AND OTHER PROCEDURES THAT REQUIRE A SHARP SURGICAL BLADE TO PUNCTURE OR CUT.: Brand: BARD-PARKER ® CARBON RIB-BACK BLADES

## (undated) DEVICE — PROTECTOR EYE PT SELF ADH NS OPT GRD LF

## (undated) DEVICE — 1010 S-DRAPE TOWEL DRAPE 10/BX: Brand: STERI-DRAPE™

## (undated) DEVICE — 35 ML SYRINGE LUER-LOCK TIP: Brand: MONOJECT

## (undated) DEVICE — GLOVE SURG SZ 8 L12IN FNGR THK87MIL WHT LTX FREE

## (undated) DEVICE — MODULE EMG W/ NVM5 HARN 2 NEEDLE/NEUROVISION 2 SLD GEL

## (undated) DEVICE — AGENT HEMSTAT 8ML FLX TIP MTRX + DISP SURGIFLO

## (undated) DEVICE — CLIP NRV STIM DYN INLINE ACT M5

## (undated) DEVICE — GARMENT COMPR STD FOR 17IN CALF UNIF THER FLOTRN

## (undated) DEVICE — SPONGE LAP W18XL18IN WHT COT 4 PLY FLD STRUNG RADPQ DISP ST

## (undated) DEVICE — SHEET, ORTHO, SPLIT, STERILE: Brand: MEDLINE

## (undated) DEVICE — CODMAN® SURGICAL PATTIES 1" X 1" (2.54CM X 2.54CM): Brand: CODMAN®

## (undated) DEVICE — TRAY URIN CATH 16FR DRNGE BG STATLOK STBL DEV F SURSTP

## (undated) DEVICE — GLOVE SURG SZ 85 L12IN FNGR THK13MIL WHT ISOLEX POLYISOPRENE

## (undated) DEVICE — NEEDLE NRV STIM BVL TIP INSUL PEDCL ACCS SYS FOR EMG MON

## (undated) DEVICE — Device

## (undated) DEVICE — COVER,TABLE,HEAVY DUTY,50"X90",STRL: Brand: MEDLINE

## (undated) DEVICE — SUTURE MCRYL SZ 4-0 L18IN ABSRB UD L19MM PS-2 3/8 CIR PRIM Y496G

## (undated) DEVICE — BUR CUT RND FLUT SFT TOUCH 4.0MM

## (undated) DEVICE — GLOVE SURG SZ 75 L12IN FNGR THK87MIL WHT LTX FREE

## (undated) DEVICE — KIT POS TBL W O HDRST JACK